# Patient Record
Sex: FEMALE | Race: WHITE | NOT HISPANIC OR LATINO | URBAN - METROPOLITAN AREA
[De-identification: names, ages, dates, MRNs, and addresses within clinical notes are randomized per-mention and may not be internally consistent; named-entity substitution may affect disease eponyms.]

---

## 2017-11-27 PROBLEM — Z00.00 ENCOUNTER FOR PREVENTIVE HEALTH EXAMINATION: Status: ACTIVE | Noted: 2017-11-27

## 2020-08-20 ENCOUNTER — INPATIENT (INPATIENT)
Facility: HOSPITAL | Age: 60
LOS: 1 days | Discharge: HOME | DRG: 281 | End: 2020-08-22
Attending: STUDENT IN AN ORGANIZED HEALTH CARE EDUCATION/TRAINING PROGRAM | Admitting: STUDENT IN AN ORGANIZED HEALTH CARE EDUCATION/TRAINING PROGRAM
Payer: MEDICARE

## 2020-08-20 VITALS
SYSTOLIC BLOOD PRESSURE: 190 MMHG | RESPIRATION RATE: 18 BRPM | HEART RATE: 119 BPM | TEMPERATURE: 98 F | OXYGEN SATURATION: 97 % | WEIGHT: 203.05 LBS | HEIGHT: 67 IN | DIASTOLIC BLOOD PRESSURE: 104 MMHG

## 2020-08-20 DIAGNOSIS — R79.89 OTHER SPECIFIED ABNORMAL FINDINGS OF BLOOD CHEMISTRY: ICD-10-CM

## 2020-08-20 LAB
ALBUMIN SERPL ELPH-MCNC: 4.6 G/DL — SIGNIFICANT CHANGE UP (ref 3.3–5)
ALP SERPL-CCNC: 82 U/L — SIGNIFICANT CHANGE UP (ref 40–120)
ALT FLD-CCNC: 13 U/L — SIGNIFICANT CHANGE UP (ref 10–45)
ANION GAP SERPL CALC-SCNC: 14 MMOL/L — SIGNIFICANT CHANGE UP (ref 5–17)
APTT BLD: 31.5 SEC — SIGNIFICANT CHANGE UP (ref 27.5–35.5)
AST SERPL-CCNC: 13 U/L — SIGNIFICANT CHANGE UP (ref 10–40)
BASE EXCESS BLDV CALC-SCNC: 0.3 MMOL/L — SIGNIFICANT CHANGE UP (ref -2–2)
BASOPHILS # BLD AUTO: 0.06 K/UL — SIGNIFICANT CHANGE UP (ref 0–0.2)
BASOPHILS NFR BLD AUTO: 0.6 % — SIGNIFICANT CHANGE UP (ref 0–2)
BILIRUB SERPL-MCNC: 0.2 MG/DL — SIGNIFICANT CHANGE UP (ref 0.2–1.2)
BUN SERPL-MCNC: 6 MG/DL — LOW (ref 7–23)
CA-I SERPL-SCNC: 1.1 MMOL/L — LOW (ref 1.12–1.3)
CALCIUM SERPL-MCNC: 9.4 MG/DL — SIGNIFICANT CHANGE UP (ref 8.4–10.5)
CHLORIDE BLDV-SCNC: 97 MMOL/L — SIGNIFICANT CHANGE UP (ref 96–108)
CHLORIDE SERPL-SCNC: 93 MMOL/L — LOW (ref 96–108)
CO2 BLDV-SCNC: 26 MMOL/L — SIGNIFICANT CHANGE UP (ref 22–30)
CO2 SERPL-SCNC: 23 MMOL/L — SIGNIFICANT CHANGE UP (ref 22–31)
CREAT SERPL-MCNC: 0.61 MG/DL — SIGNIFICANT CHANGE UP (ref 0.5–1.3)
D DIMER BLD IA.RAPID-MCNC: 412 NG/ML DDU — HIGH
EOSINOPHIL # BLD AUTO: 0.2 K/UL — SIGNIFICANT CHANGE UP (ref 0–0.5)
EOSINOPHIL NFR BLD AUTO: 1.9 % — SIGNIFICANT CHANGE UP (ref 0–6)
GAS PNL BLDV: 128 MMOL/L — LOW (ref 135–145)
GAS PNL BLDV: SIGNIFICANT CHANGE UP
GAS PNL BLDV: SIGNIFICANT CHANGE UP
GLUCOSE BLDV-MCNC: 156 MG/DL — HIGH (ref 70–99)
GLUCOSE SERPL-MCNC: 160 MG/DL — HIGH (ref 70–99)
HCO3 BLDV-SCNC: 25 MMOL/L — SIGNIFICANT CHANGE UP (ref 21–29)
HCT VFR BLD CALC: 40.2 % — SIGNIFICANT CHANGE UP (ref 34.5–45)
HCT VFR BLDA CALC: 41 % — SIGNIFICANT CHANGE UP (ref 39–50)
HGB BLD CALC-MCNC: 13.3 G/DL — SIGNIFICANT CHANGE UP (ref 11.5–15.5)
HGB BLD-MCNC: 13.2 G/DL — SIGNIFICANT CHANGE UP (ref 11.5–15.5)
IMM GRANULOCYTES NFR BLD AUTO: 0.4 % — SIGNIFICANT CHANGE UP (ref 0–1.5)
INR BLD: 0.96 RATIO — SIGNIFICANT CHANGE UP (ref 0.88–1.16)
LACTATE BLDV-MCNC: 2.2 MMOL/L — HIGH (ref 0.7–2)
LYMPHOCYTES # BLD AUTO: 1.44 K/UL — SIGNIFICANT CHANGE UP (ref 1–3.3)
LYMPHOCYTES # BLD AUTO: 13.8 % — SIGNIFICANT CHANGE UP (ref 13–44)
MAGNESIUM SERPL-MCNC: 1.7 MG/DL — SIGNIFICANT CHANGE UP (ref 1.6–2.6)
MCHC RBC-ENTMCNC: 29 PG — SIGNIFICANT CHANGE UP (ref 27–34)
MCHC RBC-ENTMCNC: 32.8 GM/DL — SIGNIFICANT CHANGE UP (ref 32–36)
MCV RBC AUTO: 88.4 FL — SIGNIFICANT CHANGE UP (ref 80–100)
MONOCYTES # BLD AUTO: 0.48 K/UL — SIGNIFICANT CHANGE UP (ref 0–0.9)
MONOCYTES NFR BLD AUTO: 4.6 % — SIGNIFICANT CHANGE UP (ref 2–14)
NEUTROPHILS # BLD AUTO: 8.2 K/UL — HIGH (ref 1.8–7.4)
NEUTROPHILS NFR BLD AUTO: 78.7 % — HIGH (ref 43–77)
NRBC # BLD: 0 /100 WBCS — SIGNIFICANT CHANGE UP (ref 0–0)
NT-PROBNP SERPL-SCNC: 358 PG/ML — HIGH (ref 0–300)
PCO2 BLDV: 43 MMHG — SIGNIFICANT CHANGE UP (ref 35–50)
PH BLDV: 7.38 — SIGNIFICANT CHANGE UP (ref 7.35–7.45)
PLATELET # BLD AUTO: 334 K/UL — SIGNIFICANT CHANGE UP (ref 150–400)
PO2 BLDV: 40 MMHG — SIGNIFICANT CHANGE UP (ref 25–45)
POTASSIUM BLDV-SCNC: 4 MMOL/L — SIGNIFICANT CHANGE UP (ref 3.5–5.3)
POTASSIUM SERPL-MCNC: 4.3 MMOL/L — SIGNIFICANT CHANGE UP (ref 3.5–5.3)
POTASSIUM SERPL-SCNC: 4.3 MMOL/L — SIGNIFICANT CHANGE UP (ref 3.5–5.3)
PROT SERPL-MCNC: 7.2 G/DL — SIGNIFICANT CHANGE UP (ref 6–8.3)
PROTHROM AB SERPL-ACNC: 11.4 SEC — SIGNIFICANT CHANGE UP (ref 10.6–13.6)
RBC # BLD: 4.55 M/UL — SIGNIFICANT CHANGE UP (ref 3.8–5.2)
RBC # FLD: 13.2 % — SIGNIFICANT CHANGE UP (ref 10.3–14.5)
SAO2 % BLDV: 76 % — SIGNIFICANT CHANGE UP (ref 67–88)
SODIUM SERPL-SCNC: 130 MMOL/L — LOW (ref 135–145)
TROPONIN T, HIGH SENSITIVITY RESULT: 214 NG/L — HIGH (ref 0–51)
TROPONIN T, HIGH SENSITIVITY RESULT: 345 NG/L — HIGH (ref 0–51)
TSH SERPL-MCNC: 1.3 UIU/ML — SIGNIFICANT CHANGE UP (ref 0.27–4.2)
WBC # BLD: 10.42 K/UL — SIGNIFICANT CHANGE UP (ref 3.8–10.5)
WBC # FLD AUTO: 10.42 K/UL — SIGNIFICANT CHANGE UP (ref 3.8–10.5)

## 2020-08-20 PROCEDURE — 99291 CRITICAL CARE FIRST HOUR: CPT

## 2020-08-20 PROCEDURE — 71275 CT ANGIOGRAPHY CHEST: CPT | Mod: 26

## 2020-08-20 PROCEDURE — 93010 ELECTROCARDIOGRAM REPORT: CPT

## 2020-08-20 PROCEDURE — 71046 X-RAY EXAM CHEST 2 VIEWS: CPT | Mod: 26

## 2020-08-20 RX ORDER — HEPARIN SODIUM 5000 [USP'U]/ML
6000 INJECTION INTRAVENOUS; SUBCUTANEOUS EVERY 6 HOURS
Refills: 0 | Status: DISCONTINUED | OUTPATIENT
Start: 2020-08-20 | End: 2020-08-22

## 2020-08-20 RX ORDER — CLOPIDOGREL BISULFATE 75 MG/1
300 TABLET, FILM COATED ORAL ONCE
Refills: 0 | Status: COMPLETED | OUTPATIENT
Start: 2020-08-20 | End: 2020-08-20

## 2020-08-20 RX ORDER — HEPARIN SODIUM 5000 [USP'U]/ML
5000 INJECTION INTRAVENOUS; SUBCUTANEOUS ONCE
Refills: 0 | Status: COMPLETED | OUTPATIENT
Start: 2020-08-20 | End: 2020-08-20

## 2020-08-20 RX ORDER — HEPARIN SODIUM 5000 [USP'U]/ML
INJECTION INTRAVENOUS; SUBCUTANEOUS
Qty: 25000 | Refills: 0 | Status: DISCONTINUED | OUTPATIENT
Start: 2020-08-20 | End: 2020-08-22

## 2020-08-20 RX ORDER — FENTANYL CITRATE 50 UG/ML
25 INJECTION INTRAVENOUS ONCE
Refills: 0 | Status: DISCONTINUED | OUTPATIENT
Start: 2020-08-20 | End: 2020-08-20

## 2020-08-20 RX ORDER — ASPIRIN/CALCIUM CARB/MAGNESIUM 324 MG
324 TABLET ORAL DAILY
Refills: 0 | Status: DISCONTINUED | OUTPATIENT
Start: 2020-08-20 | End: 2020-08-22

## 2020-08-20 RX ORDER — FAMOTIDINE 10 MG/ML
20 INJECTION INTRAVENOUS ONCE
Refills: 0 | Status: COMPLETED | OUTPATIENT
Start: 2020-08-20 | End: 2020-08-20

## 2020-08-20 RX ORDER — METOPROLOL TARTRATE 50 MG
25 TABLET ORAL ONCE
Refills: 0 | Status: COMPLETED | OUTPATIENT
Start: 2020-08-20 | End: 2020-08-20

## 2020-08-20 RX ORDER — ASPIRIN/CALCIUM CARB/MAGNESIUM 324 MG
325 TABLET ORAL ONCE
Refills: 0 | Status: DISCONTINUED | OUTPATIENT
Start: 2020-08-20 | End: 2020-08-20

## 2020-08-20 RX ADMIN — CLOPIDOGREL BISULFATE 300 MILLIGRAM(S): 75 TABLET, FILM COATED ORAL at 20:45

## 2020-08-20 RX ADMIN — Medication 324 MILLIGRAM(S): at 18:15

## 2020-08-20 RX ADMIN — HEPARIN SODIUM 1000 UNIT(S)/HR: 5000 INJECTION INTRAVENOUS; SUBCUTANEOUS at 20:51

## 2020-08-20 RX ADMIN — HEPARIN SODIUM 5000 UNIT(S): 5000 INJECTION INTRAVENOUS; SUBCUTANEOUS at 20:46

## 2020-08-20 RX ADMIN — FENTANYL CITRATE 25 MICROGRAM(S): 50 INJECTION INTRAVENOUS at 22:32

## 2020-08-20 RX ADMIN — Medication 25 MILLIGRAM(S): at 20:45

## 2020-08-20 RX ADMIN — FENTANYL CITRATE 25 MICROGRAM(S): 50 INJECTION INTRAVENOUS at 23:52

## 2020-08-20 RX ADMIN — FAMOTIDINE 20 MILLIGRAM(S): 10 INJECTION INTRAVENOUS at 22:32

## 2020-08-20 NOTE — ED ADULT NURSE NOTE - OBJECTIVE STATEMENT
60 y.o F A&Ox3 with PMH of seizure disorder, anxiety, and diabetes presents to the ED c.o heart palpitations. Pt. reports she was walking outside around 1220 and suddenly developed palpitations associated with difficulty breathing. Describes as "heart racing" sensation. Pt. reports she then had some difficulty going up stairs which is unusual for her. Pt. took blood pressure at home - pressure was a lot higher than usual. States symptoms have since subsided however sensation is still lingering. Denies SOB at this time. Denies CP, dizziness, HA, n/v/d, ABD pain, cough, lower extremity swelling, fever, chills, and numbness/tingling sensation. States she has had panic attacks in the past, but states "this feels different." Pt. does not appear to be in any acute distress. Gross neuro intact. EKG done. Pt. placed on CM. Safety and comfort provided.

## 2020-08-20 NOTE — ED ADULT NURSE REASSESSMENT NOTE - NS ED NURSE REASSESS COMMENT FT1
2200, pt reporting midsternal chest pain that gets worse when she takes deep inspiration.  pt also hypoxic to 90% on 2 liters nasal canula.   repeat ecg obtained, md alberta carvajal aware of pts chest pain.    2230, pt improved to 95% on 4 liters nasal canula, md alberta carvajal aware of increased oxygen demand, no other orders at this time.

## 2020-08-20 NOTE — ED ADULT NURSE REASSESSMENT NOTE - NS ED NURSE REASSESS COMMENT FT1
pt continues to endorese mid sternal chest pain, pt reports its a 3-4/10, md alberta carvajal aware, fentanyl was given, rn will continue to monitor.

## 2020-08-20 NOTE — ED PROVIDER NOTE - CARDIAC, MLM
Tachycardia, regular rhythm.  Heart sounds S1, S2.  No murmurs, rubs or gallops.  +2 pulses radial/DP/PT.  Mild leg swelling B/L, non-pitting.

## 2020-08-20 NOTE — ED ADULT NURSE REASSESSMENT NOTE - NS ED NURSE REASSESS COMMENT FT1
Cardiology at bedside. Pt. denies CP, SOB, dizziness, n/v/d, and diaphoresis. Pt. reports symptoms have subsided. Repeat EKG performed. Actual weight obtained. Pt. informed on blood results and plan of care at this time.

## 2020-08-20 NOTE — ED ADULT NURSE REASSESSMENT NOTE - NS ED NURSE REASSESS COMMENT FT1
pt resting on stretcher, pt is alert and oriented x3, speaking full clear sentences, respirations non-labored, skin warm dry and intact, strong pulses throughout, abd is soft and non-distended,  pt moving all extremities spontaneously, pt denies any chest pain or shortness of breath at this time, vital sig ns stable, pt denies any needs, rn will continue to monitor.

## 2020-08-20 NOTE — ED ADULT TRIAGE NOTE - CHIEF COMPLAINT QUOTE
pt states "I feel like my heart is racing", associated with feeling like she is unable to take a full breath, denies sob, diff breathing or cp

## 2020-08-20 NOTE — ED PROVIDER NOTE - OBJECTIVE STATEMENT
Patient is a 59yo female w/ PMHx metabolic syndrome, sz disorder controlled by medication (lamictal, trileptal), panic attacks, recent toe fracture w/ some immobility x1-2wks advised by PCP to come to ED for acute onset episode of tachycardia, HTN.  Patient states that she was walking outside at 12:00 and felt her "heart was racing".  She then went home, had some difficulty walking up the stairs, she called PCP, and came to [facility] ED.  She has experienced episodes like this in the past but usually due to a stressful trigger, felt that "this episode was different", but denies chest pain, palpitations, SOB, dizziness, vertigo, sweating, N/V, abd pain, changes in vision/hearing, paresthesias, muscle weakness, leg pain/swelling.  She states her seizures have been medicated for years and experiences 1-2x per year, but experienced 4x already since January 2020, with the most recent one 3 weeks ago.  Denies any LoC, falls, post-ictal state, incontinence, tongue biting today.  Denies recent fevers, URI sx, weight loss, fevers, sensitivity to hot/cold.  Patient also been taking Chinese supplement (Francine Adonis Won) x3mos. No h/o COVID-19, no known exposure to persons with COVID-19, maintains social distancing and proper mask use at all times. Patient is a 59yo female w/ PMHx metabolic syndrome, sz disorder controlled by medication (lamictal, trileptal), panic attacks, recent toe fracture w/ some immobility x1-2wks advised by PCP to come to ED for acute onset episode of tachycardia, HTN.  Patient states that she was walking outside at 12:00 and felt her "heart was racing".  She then went home, had some difficulty walking up the stairs, she called PCP, and came to [facility] ED.  She has experienced episodes like this in the past but usually due to a stressful trigger, felt that "this episode was different", but denies chest pain, palpitations, SOB, dizziness, vertigo, sweating, N/V, abd pain, changes in vision/hearing, paresthesias, muscle weakness, leg pain/swelling.  She states her seizures have been medicated for years and experiences 1-2x per year, but experienced 4x already since January 2020, with the most recent one 3 weeks ago.  Denies any LoC, falls, post-ictal state, incontinence, tongue biting today.  Denies recent fevers, URI sx, weight loss, fevers, sensitivity to hot/cold.  Patient also been taking Chinese supplement (Francine Adonis Won) x3mos. No h/o COVID-19, no known exposure to persons with COVID-19, maintains social distancing and proper mask use at all times.    PCP: Mayco Arias MD ([facility]) ([phone])

## 2020-08-20 NOTE — ED PROVIDER NOTE - PROGRESS NOTE DETAILS
Peter Tyson, MS4:  Trops 200+, +, initial EKG unchanged in comparison to old EKG, intermittent a-flutter on monitor concerning for NSTEMI.  Loaded with 325mg chewable aspirin, consulted cards (Dionne), will hold heparin/plavix until she examines patient.  Patient and her  made aware of plan, patient upset by dx but agrees with team going forward. repeat EKG shows tachy, TWI in aVL Peter yTson, MS4:  Repeat hsTn increased to 345.  D-Dimer elevated to 400+, upper limit of normal 580 based on age, nonspecific value at this point.  CXR clear per rads initial read.  Notified cards of plan to initiate heparin/plavix and admit. Peter Tyson, MS4:  Repeat hsTn increased to 345.  D-Dimer elevated to 400+, upper limit of normal 580 based on age, nonspecific value at this point.  CXR clear per rads initial read.  Notified cards of plan to initiate heparin/plavix and admit.  Cards will follow patient.

## 2020-08-20 NOTE — ED PROVIDER NOTE - ATTENDING CONTRIBUTION TO CARE
60yr F hx of HL, HTN, metabolic syndrome, sz disorder, p/w chest pain equivalent (palpitations, weakness) highly suspicious for cardiac etiology. EKG unchanged from 4yrs prior. initial troponin 214, ordered d dimer given recent hx of toe fracture and decreased mobility however, concern for PE low.   cardiology consulted at 6:00PM and will see pt momentarily.

## 2020-08-20 NOTE — ED PROVIDER NOTE - CLINICAL SUMMARY MEDICAL DECISION MAKING FREE TEXT BOX
In summary, this is a 59yo female w/ PMHx metabolic syndrome, sz disorder controlled by medication (lamictal, trileptal), panic attacks, recent toe fracture advised by PCP to come to ED for acute onset episode of tachycardia, HTN most likely consistent with prior panic attacks w/ need to r/o acute MI, arrhythmia, PE, hyperthyroidism.  Less concern for pheochromocytoma and other rarer etiologies at this time.  V/S significant for  /99, RR 23, afebrile.  EKG sinus, narrow-complex tachycardia but otherwise unchanged from prior read several years ago.  Wells PE Score 3, PERC Score 3.  Will investigate Francine Adonis Barroso supplement (contains licorice) and correlate with returned CMP results if hypokalemia or other possible findings.  Plan includes monitoring V/S, labs, CXR.  Will consider further consultation/management as appropriate.

## 2020-08-20 NOTE — ED ADULT NURSE REASSESSMENT NOTE - NS ED NURSE REASSESS COMMENT FT1
md alberta carvajal aware of elevated trop, md paging cardiology at this time. no change in pt condition.

## 2020-08-21 DIAGNOSIS — E78.5 HYPERLIPIDEMIA, UNSPECIFIED: ICD-10-CM

## 2020-08-21 DIAGNOSIS — E11.9 TYPE 2 DIABETES MELLITUS WITHOUT COMPLICATIONS: ICD-10-CM

## 2020-08-21 DIAGNOSIS — R79.89 OTHER SPECIFIED ABNORMAL FINDINGS OF BLOOD CHEMISTRY: ICD-10-CM

## 2020-08-21 DIAGNOSIS — E87.1 HYPO-OSMOLALITY AND HYPONATREMIA: ICD-10-CM

## 2020-08-21 DIAGNOSIS — G40.909 EPILEPSY, UNSPECIFIED, NOT INTRACTABLE, WITHOUT STATUS EPILEPTICUS: ICD-10-CM

## 2020-08-21 LAB
A1C WITH ESTIMATED AVERAGE GLUCOSE RESULT: 6.3 % — HIGH (ref 4–5.6)
ANION GAP SERPL CALC-SCNC: 12 MMOL/L — SIGNIFICANT CHANGE UP (ref 5–17)
APTT BLD: 38.6 SEC — HIGH (ref 27.5–35.5)
APTT BLD: 74 SEC — HIGH (ref 27.5–35.5)
BUN SERPL-MCNC: 6 MG/DL — LOW (ref 7–23)
CALCIUM SERPL-MCNC: 9 MG/DL — SIGNIFICANT CHANGE UP (ref 8.4–10.5)
CHLORIDE SERPL-SCNC: 91 MMOL/L — LOW (ref 96–108)
CHOLEST SERPL-MCNC: 282 MG/DL — HIGH (ref 10–199)
CK MB BLD-MCNC: 8.3 % — HIGH (ref 0–3.5)
CK MB CFR SERPL CALC: 8.6 NG/ML — HIGH (ref 0–3.8)
CK SERPL-CCNC: 103 U/L — SIGNIFICANT CHANGE UP (ref 25–170)
CO2 SERPL-SCNC: 23 MMOL/L — SIGNIFICANT CHANGE UP (ref 22–31)
CREAT SERPL-MCNC: 0.51 MG/DL — SIGNIFICANT CHANGE UP (ref 0.5–1.3)
ESTIMATED AVERAGE GLUCOSE: 134 MG/DL — HIGH (ref 68–114)
GLUCOSE SERPL-MCNC: 226 MG/DL — HIGH (ref 70–99)
HCT VFR BLD CALC: 42.2 % — SIGNIFICANT CHANGE UP (ref 34.5–45)
HDLC SERPL-MCNC: 89 MG/DL — SIGNIFICANT CHANGE UP
HGB BLD-MCNC: 14.4 G/DL — SIGNIFICANT CHANGE UP (ref 11.5–15.5)
LIPID PNL WITH DIRECT LDL SERPL: 169 MG/DL — HIGH
MCHC RBC-ENTMCNC: 29.5 PG — SIGNIFICANT CHANGE UP (ref 27–34)
MCHC RBC-ENTMCNC: 34.1 GM/DL — SIGNIFICANT CHANGE UP (ref 32–36)
MCV RBC AUTO: 86.5 FL — SIGNIFICANT CHANGE UP (ref 80–100)
NRBC # BLD: 0 /100 WBCS — SIGNIFICANT CHANGE UP (ref 0–0)
PLATELET # BLD AUTO: 358 K/UL — SIGNIFICANT CHANGE UP (ref 150–400)
POTASSIUM SERPL-MCNC: 4.6 MMOL/L — SIGNIFICANT CHANGE UP (ref 3.5–5.3)
POTASSIUM SERPL-SCNC: 4.6 MMOL/L — SIGNIFICANT CHANGE UP (ref 3.5–5.3)
RBC # BLD: 4.88 M/UL — SIGNIFICANT CHANGE UP (ref 3.8–5.2)
RBC # FLD: 13.2 % — SIGNIFICANT CHANGE UP (ref 10.3–14.5)
SARS-COV-2 IGG SERPL QL IA: NEGATIVE — SIGNIFICANT CHANGE UP
SARS-COV-2 IGM SERPL IA-ACNC: <0.1 INDEX — SIGNIFICANT CHANGE UP
SARS-COV-2 RNA SPEC QL NAA+PROBE: SIGNIFICANT CHANGE UP
SODIUM SERPL-SCNC: 126 MMOL/L — LOW (ref 135–145)
TOTAL CHOLESTEROL/HDL RATIO MEASUREMENT: 3.2 RATIO — LOW (ref 3.3–7.1)
TRIGL SERPL-MCNC: 122 MG/DL — SIGNIFICANT CHANGE UP (ref 10–149)
TROPONIN T, HIGH SENSITIVITY RESULT: 190 NG/L — HIGH (ref 0–51)
TROPONIN T, HIGH SENSITIVITY RESULT: 326 NG/L — HIGH (ref 0–51)
WBC # BLD: 11.51 K/UL — HIGH (ref 3.8–10.5)
WBC # FLD AUTO: 11.51 K/UL — HIGH (ref 3.8–10.5)

## 2020-08-21 PROCEDURE — 93458 L HRT ARTERY/VENTRICLE ANGIO: CPT | Mod: 26,GC

## 2020-08-21 PROCEDURE — 99152 MOD SED SAME PHYS/QHP 5/>YRS: CPT | Mod: GC

## 2020-08-21 PROCEDURE — 99255 IP/OBS CONSLTJ NEW/EST HI 80: CPT

## 2020-08-21 PROCEDURE — 93306 TTE W/DOPPLER COMPLETE: CPT | Mod: 26

## 2020-08-21 PROCEDURE — 93010 ELECTROCARDIOGRAM REPORT: CPT | Mod: 59

## 2020-08-21 RX ORDER — DEXTROSE 50 % IN WATER 50 %
25 SYRINGE (ML) INTRAVENOUS ONCE
Refills: 0 | Status: DISCONTINUED | OUTPATIENT
Start: 2020-08-21 | End: 2020-08-22

## 2020-08-21 RX ORDER — GLUCAGON INJECTION, SOLUTION 0.5 MG/.1ML
1 INJECTION, SOLUTION SUBCUTANEOUS ONCE
Refills: 0 | Status: DISCONTINUED | OUTPATIENT
Start: 2020-08-21 | End: 2020-08-22

## 2020-08-21 RX ORDER — SODIUM CHLORIDE 9 MG/ML
1000 INJECTION, SOLUTION INTRAVENOUS
Refills: 0 | Status: DISCONTINUED | OUTPATIENT
Start: 2020-08-21 | End: 2020-08-22

## 2020-08-21 RX ORDER — DEXTROSE 50 % IN WATER 50 %
15 SYRINGE (ML) INTRAVENOUS ONCE
Refills: 0 | Status: DISCONTINUED | OUTPATIENT
Start: 2020-08-21 | End: 2020-08-22

## 2020-08-21 RX ORDER — OXCARBAZEPINE 300 MG/1
1 TABLET, FILM COATED ORAL
Qty: 0 | Refills: 0 | DISCHARGE

## 2020-08-21 RX ORDER — LAMOTRIGINE 25 MG/1
300 TABLET, ORALLY DISINTEGRATING ORAL
Refills: 0 | Status: DISCONTINUED | OUTPATIENT
Start: 2020-08-21 | End: 2020-08-22

## 2020-08-21 RX ORDER — FUROSEMIDE 40 MG
40 TABLET ORAL
Refills: 0 | Status: DISCONTINUED | OUTPATIENT
Start: 2020-08-21 | End: 2020-08-22

## 2020-08-21 RX ORDER — OXCARBAZEPINE 300 MG/1
600 TABLET, FILM COATED ORAL
Refills: 0 | Status: DISCONTINUED | OUTPATIENT
Start: 2020-08-21 | End: 2020-08-22

## 2020-08-21 RX ORDER — DEXTROSE 50 % IN WATER 50 %
12.5 SYRINGE (ML) INTRAVENOUS ONCE
Refills: 0 | Status: DISCONTINUED | OUTPATIENT
Start: 2020-08-21 | End: 2020-08-22

## 2020-08-21 RX ORDER — METFORMIN HYDROCHLORIDE 850 MG/1
500 TABLET ORAL
Refills: 0 | Status: DISCONTINUED | OUTPATIENT
Start: 2020-08-21 | End: 2020-08-21

## 2020-08-21 RX ORDER — INSULIN LISPRO 100/ML
VIAL (ML) SUBCUTANEOUS
Refills: 0 | Status: DISCONTINUED | OUTPATIENT
Start: 2020-08-21 | End: 2020-08-22

## 2020-08-21 RX ORDER — ACETAMINOPHEN 500 MG
975 TABLET ORAL EVERY 6 HOURS
Refills: 0 | Status: DISCONTINUED | OUTPATIENT
Start: 2020-08-21 | End: 2020-08-22

## 2020-08-21 RX ORDER — ONDANSETRON 8 MG/1
4 TABLET, FILM COATED ORAL EVERY 6 HOURS
Refills: 0 | Status: DISCONTINUED | OUTPATIENT
Start: 2020-08-21 | End: 2020-08-22

## 2020-08-21 RX ADMIN — OXCARBAZEPINE 600 MILLIGRAM(S): 300 TABLET, FILM COATED ORAL at 11:14

## 2020-08-21 RX ADMIN — Medication 975 MILLIGRAM(S): at 07:32

## 2020-08-21 RX ADMIN — ONDANSETRON 4 MILLIGRAM(S): 8 TABLET, FILM COATED ORAL at 00:38

## 2020-08-21 RX ADMIN — HEPARIN SODIUM 1300 UNIT(S)/HR: 5000 INJECTION INTRAVENOUS; SUBCUTANEOUS at 04:51

## 2020-08-21 RX ADMIN — OXCARBAZEPINE 600 MILLIGRAM(S): 300 TABLET, FILM COATED ORAL at 19:11

## 2020-08-21 RX ADMIN — LAMOTRIGINE 300 MILLIGRAM(S): 25 TABLET, ORALLY DISINTEGRATING ORAL at 11:14

## 2020-08-21 RX ADMIN — HEPARIN SODIUM 1200 UNIT(S)/HR: 5000 INJECTION INTRAVENOUS; SUBCUTANEOUS at 12:02

## 2020-08-21 RX ADMIN — Medication 975 MILLIGRAM(S): at 06:11

## 2020-08-21 RX ADMIN — LAMOTRIGINE 300 MILLIGRAM(S): 25 TABLET, ORALLY DISINTEGRATING ORAL at 19:11

## 2020-08-21 RX ADMIN — Medication 40 MILLIGRAM(S): at 22:42

## 2020-08-21 RX ADMIN — Medication 324 MILLIGRAM(S): at 11:59

## 2020-08-21 RX ADMIN — HEPARIN SODIUM 6000 UNIT(S): 5000 INJECTION INTRAVENOUS; SUBCUTANEOUS at 04:53

## 2020-08-21 NOTE — H&P ADULT - HISTORY OF PRESENT ILLNESS
60yr old female with a pmhx of seizure disorder, prediabetes, and panic attacks who presents for an episode of heart racing and palpitations that began while walking up the stairs to her [employer] around 12:20 today. Patient states she was walking outside and felt her heart was racing . She then slowed down and gradually returned home and noted it to ease but was still present. She has noted episodes like this in the distant past. She called her PCP and he recommended that she come to the emergency room so she decided to take an UBER here.  She denied any chest pain or shortness of breath with her quickened heart rate. She denies associated diaphoresis, chest pain, SOB, dizziness, vertigo, sweating, N/V, abd pain.   Patient also been taking Chinese supplement (Francine Adonis Won) x3mos.   In the ED, her heart rate was episodically increasing and she would have intermittent episodes of anxiety however overall her pain did decrease.   In the ED stay, the patient had a new oxygen requirement, and stated that she started to have chest discomfort in the suprasternal area and down to the right side of her chest only when she took a deep breath. Her oxygen was increased to 4 liters and she is comfortable.

## 2020-08-21 NOTE — H&P ADULT - NSHPPHYSICALEXAM_GEN_ALL_CORE
Vital Signs Last 24 Hrs  T(C): 36.4 (21 Aug 2020 00:46), Max: 36.8 (20 Aug 2020 18:39)  T(F): 97.6 (21 Aug 2020 00:46), Max: 98.2 (20 Aug 2020 18:39)  HR: 82 (21 Aug 2020 00:46) (82 - 119)  BP: 144/81 (21 Aug 2020 00:46) (144/81 - 190/104)  BP(mean): 117 (20 Aug 2020 18:39) (117 - 117)  RR: 16 (21 Aug 2020 00:46) (15 - 25)  SpO2: 95% (21 Aug 2020 00:46) (90% - 100%)    GENERAL: NAD, AAOx3  HEAD:  Atraumatic, Normocephalic  EYES: EOMI, conjunctiva and sclera clear  NECK: Supple,  No LAD  CHEST/LUNG: Clear bilaterally , No wheeze  HEART: Regular rate and rhythm; No murmurs, rubs, or gallops  ABDOMEN: Soft, Nontender, Nondistended; Bowel sounds present  EXTREMITIES:  +Pulses, No clubbing, cyanosis, or edema  SKIN: No rashes or lesions

## 2020-08-21 NOTE — H&P ADULT - ASSESSMENT
60 yr old female with a pmhx seizure history presents after an episode of heart racing and palpitations found to have elevations of troponins and EKG abnormalities.

## 2020-08-21 NOTE — CONSULT NOTE ADULT - SUBJECTIVE AND OBJECTIVE BOX
Tarsha Truong MD  Cardiology Fellow, PGY-4  [phone]  All Cardiology service information can be found 24/7 on [email or web address], password: [email or web address]    Patient seen and evaluated at bedside    Chief Complaint: high heart rate    HPI: Johana Couch is a 60F with seizure disorder, prediabetes, and panic attacks who presents for tachycardia.    The patient earlier today stated that she felt like her heart rate was going fast when she was out on a walk. It started around noon, and lasted for hours, so she decided to come in. She denied any chest pain or shortness of breath with her high heart rate.     In the ED, her heart rate did increase to around the 160s, which increased from the low 100s, and would go up and down, but not suddenly. Trop was elevated to 214 then 300s. The patient during the interview did have anxiety, and then did complain of chest tightness for approximately 1 minute, and then went away. At that time her HR was around 120. Later during the ED stay, the patient did have a new oxygen requirement, and stated that she started to have chest discomfort in the suprasternal area and down to the right side of her chest only when she took a deep breath. She stated this happened after the ED gave her medications (she received ASA, plavix, heparin bolus).     PMHx:   Prediabetes  Seizure disorder    PSHx:   No significant past surgical history    Allergies:  No Known Allergies    Home Meds:    Current Medications:   aspirin  chewable 324 milliGRAM(s) Oral daily  heparin   Injectable 6000 Unit(s) IV Push every 6 hours PRN  heparin  Infusion.  Unit(s)/Hr IV Continuous <Continuous>  ondansetron Injectable 4 milliGRAM(s) IV Push every 6 hours PRN    FAMILY HISTORY:  Social History:  Smoking History:  Alcohol Use:  Drug Use:    REVIEW OF SYSTEMS:  CONSTITUTIONAL: No weakness, fevers or chills  EYES/ENT: No visual changes;  No dysphagia  NECK: No pain or stiffness  RESPIRATORY: No cough, wheezing, hemoptysis; No shortness of breath  CARDIOVASCULAR: No chest pain. + rapid heart rate; No lower extremity edema. No chest wall tenderness to palpation.  GASTROINTESTINAL: No abdominal or epigastric pain. No nausea, vomiting, or hematemesis; No diarrhea or constipation. No melena or hematochezia.  BACK: No back pain  GENITOURINARY: No dysuria, frequency or hematuria  NEUROLOGICAL: No numbness or weakness  SKIN: No itching, burning, rashes, or lesions   All other review of systems is negative unless indicated above.    Physical Exam:  T(F): 98.2 (08-20), Max: 98.2 (08-20)  HR: 87 (08-20) (87 - 119)  BP: 169/96 (08-20) (146/97 - 190/104)  RR: 16 (08-20)  SpO2: 95% (08-20)  GENERAL: No acute distress, well-developed  HEAD:  Atraumatic, Normocephalic  ENT: EOMI, PERRLA, conjunctiva and sclera clear, Neck supple, No JVD, moist mucosa  CHEST/LUNG: Clear to auscultation bilaterally; No wheeze, equal breath sounds bilaterally   BACK: No spinal tenderness  HEART: Regular rate and rhythm, tachycardic; No murmurs, rubs, or gallops  ABDOMEN: Soft, Nontender, Nondistended; Bowel sounds present  EXTREMITIES:  No clubbing, cyanosis, or edema  PSYCH: Nl behavior, nl affect  NEUROLOGY: AAOx3, non-focal, cranial nerves intact  SKIN: Normal color, No rashes or lesions  LINES:    Cardiovascular Diagnostic Testing:    ECG: Personally reviewed: Sinus tachycardia, LAD, LVH, TWI aVL, no ST elevations or depressions    Echo: None    Stress Testing: None    Cath: None    Labs: Personally reviewed                        13.2   10.42 )-----------( 334      ( 20 Aug 2020 17:12 )             40.2     08-20    130<L>  |  93<L>  |  6<L>  ----------------------------<  160<H>  4.3   |  23  |  0.61    Ca    9.4      20 Aug 2020 17:12  Mg     1.7     08-20    TPro  7.2  /  Alb  4.6  /  TBili  0.2  /  DBili  x   /  AST  13  /  ALT  13  /  AlkPhos  82  08-20    PT/INR - ( 20 Aug 2020 20:57 )   PT: 11.4 sec;   INR: 0.96 ratio         PTT - ( 20 Aug 2020 20:57 )  PTT:31.5 sec    CARDIAC MARKERS ( 20 Aug 2020 19:35 )  345 ng/L / x     / x     / x     / x     / x      CARDIAC MARKERS ( 20 Aug 2020 17:12 )  214 ng/L / x     / x     / x     / x     / x        Serum Pro-Brain Natriuretic Peptide: 358 pg/mL (08-20 @ 17:12)    Thyroid Stimulating Hormone, Serum: 1.30 uIU/mL (08-20 @ 22:13)
[facility] Consult Note Nephrology - CONSULTATION NOTE    Johana Couch is a 60F with seizure disorder, prediabetes, and panic attacks who presents for tachycardia.    The patient earlier today stated that she felt like her heart rate was going fast when she was out on a walk. It started around noon, and lasted for hours, so she decided to come in. She denied any chest pain or shortness of breath with her high heart rate.     In the ED, her heart rate did increase to around the 160s, which increased from the low 100s, and would go up and down, but not suddenly. Trop was elevated to 214 then 300s. The patient during the interview did have anxiety, and then did complain of chest tightness for approximately 1 minute, and then went away. At that time her HR was around 120. Later during the ED stay, the patient did have a new oxygen requirement, and stated that she started to have chest discomfort in the suprasternal area and down to the right side of her chest only when she took a deep breath. She stated this happened after the ED gave her medications (she received ASA, plavix, heparin bolus).     Renal consult for Hyponatremia.  Na is 126-->corrected is around 128  Pt with hx of chronic Hyponatremia- pt does not know baseline Na  Currently on trileptal  Has been consuming water.  CT chest showing pulm edema  denies any nausea or vomiting    PAST MEDICAL & SURGICAL HISTORY:  Diabetes  Seizure disorder  No significant past surgical history    No Known Allergies    Home Medications Reviewed  Hospital Medications:   MEDICATIONS  (STANDING):  aspirin  chewable 324 milliGRAM(s) Oral daily  dextrose 5%. 1000 milliLiter(s) (50 mL/Hr) IV Continuous <Continuous>  dextrose 50% Injectable 12.5 Gram(s) IV Push once  dextrose 50% Injectable 25 Gram(s) IV Push once  dextrose 50% Injectable 25 Gram(s) IV Push once  furosemide   Injectable 40 milliGRAM(s) IV Push two times a day  heparin  Infusion.  Unit(s)/Hr (10 mL/Hr) IV Continuous <Continuous>  insulin lispro (HumaLOG) corrective regimen sliding scale   SubCutaneous three times a day before meals  lamoTRIgine 300 milliGRAM(s) Oral two times a day  OXcarbazepine 600 milliGRAM(s) Oral two times a day    SOCIAL HISTORY:  Denies ETOh,Smoking,   FAMILY HISTORY:    REVIEW OF SYSTEMS:  CONSTITUTIONAL: No weakness, fevers or chills  EYES/ENT: No visual changes;  No vertigo or throat pain   NECK: No pain or stiffness  RESPIRATORY: No cough, wheezing, hemoptysis; No shortness of breath  CARDIOVASCULAR: No chest pain or palpitations.  GASTROINTESTINAL: No abdominal or epigastric pain. No nausea, vomiting, or hematemesis; No diarrhea or constipation. No melena or hematochezia.  GENITOURINARY: No dysuria, frequency, foamy urine, urinary urgency, incontinence or hematuria  NEUROLOGICAL: No numbness or weakness  SKIN: No itching, burning, rashes, or lesions   VASCULAR: No bilateral lower extremity edema.   All other review of systems is negative unless indicated above.    VITALS:  T(F): 97.8 (08-21-20 @ 11:47), Max: 98.2 (08-20-20 @ 18:39)  HR: 80 (08-21-20 @ 11:47)  BP: 157/89 (08-21-20 @ 11:47)  RR: 18 (08-21-20 @ 11:47)  SpO2: 93% (08-21-20 @ 11:47)  Wt(kg): --    08-20 @ 07:01  -  08-21 @ 07:00  --------------------------------------------------------  IN: 128 mL / OUT: 0 mL / NET: 128 mL      Height (cm): 170.2 (08-21 @ 00:46)  Weight (kg): 97 (08-21 @ 00:46)  BMI (kg/m2): 33.5 (08-21 @ 00:46)  BSA (m2): 2.08 (08-21 @ 00:46)  PHYSICAL EXAM:  Constitutional: NAD  HEENT: anicteric sclera, oropharynx clear, MMM  Neck: No JVD  Respiratory: b/l rhonchi  Cardiovascular: S1, S2, RRR  Gastrointestinal: BS+, soft, NT/ND  Extremities: + peripheral edema  Neurological: A/O x 3, no focal deficits  Psychiatric: Normal mood, normal affect  : No CVA tenderness. No collazo.       LABS:  08-21    126<L>  |  91<L>  |  6<L>  ----------------------------<  226<H>  4.6   |  23  |  0.51    Ca    9.0      21 Aug 2020 03:24  Mg     1.7     08-20    TPro  7.2  /  Alb  4.6  /  TBili  0.2  /  DBili      /  AST  13  /  ALT  13  /  AlkPhos  82  08-20    Creatinine Trend: 0.51 <--, 0.61 <--                        14.4   11.51 )-----------( 358      ( 21 Aug 2020 03:24 )             42.2     Urine Studies:      RADIOLOGY & ADDITIONAL STUDIES:
PULMONARY CONSULT  Howard Mcneil MD  [phone]    Initial HPI on admission:  HPI:  60yr old female with a pmhx of seizure disorder, prediabetes, and panic attacks who presents for an episode of heart racing and palpitations that began while walking up the stairs to her [employer] lessons around 12:20 today. Patient states she was walking outside and felt her heart was racing . She then slowed down and gradually returned home and noted it to ease but was still present. She has noted episodes like this in the distant past. She called her PCP and he recommended that she come to the emergency room so she decided to take an UBER here.  She denied any chest pain or shortness of breath with her quickened heart rate. She denies associated diaphoresis, chest pain, SOB, dizziness, vertigo, sweating, N/V, abd pain.   Patient also been taking Chinese supplement (Francine Adonis Won) x3mos.   In the ED, her heart rate was episodically increasing and she would have intermittent episodes of anxiety however overall her pain did decrease.   In the ED stay, the patient had a new oxygen requirement, and stated that she started to have chest discomfort in the suprasternal area and down to the right side of her chest only when she took a deep breath. Her oxygen was increased to 4 liters and she is comfortable.         PAST MEDICAL & SURGICAL HISTORY:  Diabetes  Seizure disorder  No significant past surgical history    Allergies    No Known Allergies    Intolerances      FAMILY HISTORY:    Social History (marital status, living situation, occupation, tobacco use, alcohol and drug use, and sexual history): lives at home with    no smoking  no drinking	     Tobacco Screening:  · Core Measure Site	No	  · Has the patient used tobacco in the past 30 days?	No	      Review of Systems: REVIEW OF SYSTEMS:  General: no weakness, no fever/chills, no weight loss/gain  Skin/Breast: no rash, no jaundice  Ophthalmologic: no vision changes, no dry eyes   Respiratory and Thorax: no cough, no wheezing, no hemoptysis, no dyspnea  Cardiovascular: + chest pain, no shortness of breath, no orthopnea  Gastrointestinal: no n/v/d, no abdominal pain, no dysphagia   Genitourinary: no dysuria, no frequency, no nocturia, no hematuria  Musculoskeletal: no trauma, no sprain/strain, no myalgias, no arthralgias, no fracture  Neurological: no HA, no dizziness, no weakness, no numbness  Psychiatric: no depression, no SI/HI  Hematology/Lymphatics: no easy bruising  Endocrine: no heat or cold intolerance. no weight gain or loss Allergic/Immunologic: no allergy or recent reaction	      Allergies and Intolerances:        Allergies:  	No Known Allergies:     Medications:  MEDICATIONS  (STANDING):  aspirin  chewable 324 milliGRAM(s) Oral daily  dextrose 5%. 1000 milliLiter(s) (50 mL/Hr) IV Continuous <Continuous>  dextrose 50% Injectable 12.5 Gram(s) IV Push once  dextrose 50% Injectable 25 Gram(s) IV Push once  dextrose 50% Injectable 25 Gram(s) IV Push once  furosemide   Injectable 40 milliGRAM(s) IV Push two times a day  heparin  Infusion.  Unit(s)/Hr (10 mL/Hr) IV Continuous <Continuous>  insulin lispro (HumaLOG) corrective regimen sliding scale   SubCutaneous three times a day before meals  lamoTRIgine 300 milliGRAM(s) Oral two times a day  OXcarbazepine 600 milliGRAM(s) Oral two times a day    MEDICATIONS  (PRN):  acetaminophen   Tablet .. 975 milliGRAM(s) Oral every 6 hours PRN Moderate Pain (4 - 6)  dextrose 40% Gel 15 Gram(s) Oral once PRN Blood Glucose LESS THAN 70 milliGRAM(s)/deciliter  glucagon  Injectable 1 milliGRAM(s) IntraMuscular once PRN Glucose LESS THAN 70 milligrams/deciliter  heparin   Injectable 6000 Unit(s) IV Push every 6 hours PRN For aPTT less than 40  ondansetron Injectable 4 milliGRAM(s) IV Push every 6 hours PRN Nausea    Vital Signs Last 24 Hrs  T(C): 36.6 (21 Aug 2020 11:47), Max: 36.8 (20 Aug 2020 18:39)  T(F): 97.8 (21 Aug 2020 11:47), Max: 98.2 (20 Aug 2020 18:39)  HR: 80 (21 Aug 2020 11:47) (80 - 119)  BP: 157/89 (21 Aug 2020 11:47) (144/74 - 190/104)  BP(mean): 117 (20 Aug 2020 18:39) (117 - 117)  RR: 18 (21 Aug 2020 11:47) (15 - 25)  SpO2: 93% (21 Aug 2020 11:47) (90% - 100%)          08-20 @ 07:01  -  08-21 @ 07:00  --------------------------------------------------------  IN: 128 mL / OUT: 0 mL / NET: 128 mL      LABS:                        14.4   11.51 )-----------( 358      ( 21 Aug 2020 03:24 )             42.2     08-21    126<L>  |  91<L>  |  6<L>  ----------------------------<  226<H>  4.6   |  23  |  0.51    Ca    9.0      21 Aug 2020 03:24  Mg     1.7     08-20    TPro  7.2  /  Alb  4.6  /  TBili  0.2  /  DBili  x   /  AST  13  /  ALT  13  /  AlkPhos  82  08-20      PT/INR - ( 20 Aug 2020 20:57 )   PT: 11.4 sec;   INR: 0.96 ratio         PTT - ( 21 Aug 2020 11:14 )  PTT:74.0 sec      Serum Pro-Brain Natriuretic Peptide: 358 pg/mL (08-20-20 @ 17:12)    Physical Examination:    General: No acute distress.      HEENT: Pupils equal, reactive to light.  Symmetric.    PULM: Clear to auscultation bilaterally, no significant sputum production    CVS: Regular rate and rhythm, no murmurs, rubs, or gallops    ABD: Soft, nondistended, nontender, normoactive bowel sounds, no masses    EXT: No edema, nontender    SKIN: Warm and well perfused, no rashes noted.    NEURO: Alert, oriented, interactive, nonfocal    RADIOLOGY REVIEWED PERSONALLY  CXR:    CTA chest:    PROCEDURE:  CT Angiography of the Chest.  90 ml of Omnipaque 350 was injected intravenously. 10 ml were discarded.  Sagittal and coronal reformats were performed as well as 3D (MIP) reconstructions.    FINDINGS:    LUNGS AND AIRWAYS: Patent central airways.  Patchy groundglass and airspace opacity in both lungswith areas of interlobular septal thickening bilaterally is suspicious for pulmonary edema.  A more discrete 1.7 x 1 cm nodular focus in the superior segment of the right upper lobe (5:44) may also be related to pulmonary edema, however an underlyinglung nodule cannot be excluded.  PLEURA: Small pleural effusions bilaterally.  MEDIASTINUM AND FAB: No lymphadenopathy.  VESSELS: Within normal limits.  HEART: Heart size is normal. No pericardial effusion.  CHEST WALL AND LOWER NECK: Within normal limits.  VISUALIZED UPPER ABDOMEN: Cholelithiasis.  Small hiatal hernia.  BONES: Within normal limits.    IMPRESSION:  1.  No pulmonary embolism.  2.  Patchy groundglass and airspace opacity in both lungs with areas of interlobular septal thickening, suspicious for pulmonary edema.  3.  Small pleural effusions bilaterally..  4.  A more discrete 1.7 x 1 cm nodular focus in the superior segment of the right upper lobe may also be related to pulmonary edema, however an underlying lung nodule cannot be excluded.  A follow-up chest CT scan can be obtained in 1-3 months to reassess this finding.  5.  Cholelithiasis.    TTE:      Assessment:    Plan:

## 2020-08-21 NOTE — H&P ADULT - ATTENDING COMMENTS
pt seen and examined. as per above.    60yr old female with a pmhx of seizure disorder, prediabetes, and panic attacks who presents for an episode of heart racing and palpitations unlike her panic attacks.  CTA neg PE  cont tele, monitor for sinus tach  trops downtrending likely demand ischemia  check TTE  cardio consult  hyponatremia, renal consult  lasix 40mg bid for pulm edema  cannot rule out RUL lung nodule, repeat CT chest outpt, pulm consult

## 2020-08-21 NOTE — H&P ADULT - PROBLEM SELECTOR PLAN 1
Patient started with aspirin, heparin drip, cardiology on board, plavix started. npo after midnight.

## 2020-08-21 NOTE — H&P ADULT - NSHPREVIEWOFSYSTEMS_GEN_ALL_CORE
REVIEW OF SYSTEMS:  General: no weakness, no fever/chills, no weight loss/gain  Skin/Breast: no rash, no jaundice  Ophthalmologic: no vision changes, no dry eyes   Respiratory and Thorax: no cough, no wheezing, no hemoptysis, no dyspnea  Cardiovascular: + chest pain, no shortness of breath, no orthopnea  Gastrointestinal: no n/v/d, no abdominal pain, no dysphagia   Genitourinary: no dysuria, no frequency, no nocturia, no hematuria  Musculoskeletal: no trauma, no sprain/strain, no myalgias, no arthralgias, no fracture  Neurological: no HA, no dizziness, no weakness, no numbness  Psychiatric: no depression, no SI/HI  Hematology/Lymphatics: no easy bruising  Endocrine: no heat or cold intolerance. no weight gain or loss  Allergic/Immunologic: no allergy or recent reaction

## 2020-08-21 NOTE — H&P ADULT - NSHPLABSRESULTS_GEN_ALL_CORE
LABS:                        13.2   10.42 )-----------( 334      ( 20 Aug 2020 17:12 )             40.2     08-20    130<L>  |  93<L>  |  6<L>  ----------------------------<  160<H>  4.3   |  23  |  0.61    Ca    9.4      20 Aug 2020 17:12  Mg     1.7     08-20    TPro  7.2  /  Alb  4.6  /  TBili  0.2  /  DBili  x   /  AST  13  /  ALT  13  /  AlkPhos  82  08-20    PT/INR - ( 20 Aug 2020 20:57 )   PT: 11.4 sec;   INR: 0.96 ratio         PTT - ( 20 Aug 2020 20:57 )  PTT:31.5 sec  CAPILLARY BLOOD GLUCOSE        CARDIAC MARKERS ( 20 Aug 2020 23:27 )  x     / x     / 103 U/L / x     / 8.6 ng/mL          RADIOLOGY & ADDITIONAL TESTS:    Imaging Personally Reviewed:  [x] YES  [ ] NO    Consultant(s) Notes Reviewed:  [x] YES  [ ] NO    Care Discussed with Consultants/Other Providers [x] YES  [ ] NO

## 2020-08-21 NOTE — CONSULT NOTE ADULT - ASSESSMENT
Johana Couch is a 60F with seizure disorder, prediabetes, and panic attacks who presents for tachycardia.    Sinus tachycardia: Patient has sinus tachycardia on EKG and telemetry, which may possibly relate to her anxiety. She does have HR that are rapid in the 150s concerning for possible atrial flutter (based on the rate) or atrial tachycardia, however it is difficult to discern on the telemetry strip. Regardless, she would not need anticoagulation if she did have atrial flutter.   - No indication to treat sinus tachycardia, and patient at times during this stay has had normal heart rates     NSTEMI, likely Type II: Patient does have a slight troponin elevation, however patient denies any chest pain that caused her to present to the hospital, and does not have any concerning EKG findings. Patient does have a TWI in aVL, however likely from her LVH. It's possible that with her elevation in her D-dimer, new oxygen requirement and tachycardia that she could have a PE.  - can trend troponin to peak  - would recommend a CTPE to evaluate for possible PE
Johana Couch is a 60F with seizure disorder, prediabetes, and panic attacks who presents for tachycardia found to have HYponatremia

## 2020-08-21 NOTE — CONSULT NOTE ADULT - PROBLEM SELECTOR RECOMMENDATION 9
Multifactorial--> Trileptal could be causing it vs pulm edema ( Hypervolemia)  TSH is within range  check cortisol , uric acid and serum osm  check URINE OSM, na ,cr, cl , k  Free h20 restriction to 800cc  start Lasix 40mg iv bid  recheck na in 6 hours  call   with results  monitor closely

## 2020-08-22 ENCOUNTER — TRANSCRIPTION ENCOUNTER (OUTPATIENT)
Age: 60
End: 2020-08-22

## 2020-08-22 VITALS
HEART RATE: 90 BPM | TEMPERATURE: 99 F | OXYGEN SATURATION: 95 % | DIASTOLIC BLOOD PRESSURE: 84 MMHG | SYSTOLIC BLOOD PRESSURE: 148 MMHG | RESPIRATION RATE: 18 BRPM

## 2020-08-22 LAB
A1C WITH ESTIMATED AVERAGE GLUCOSE RESULT: 6.3 % — HIGH (ref 4–5.6)
ANION GAP SERPL CALC-SCNC: 18 MMOL/L — HIGH (ref 5–17)
APPEARANCE UR: CLEAR — SIGNIFICANT CHANGE UP
BACTERIA # UR AUTO: NEGATIVE — SIGNIFICANT CHANGE UP
BILIRUB UR-MCNC: NEGATIVE — SIGNIFICANT CHANGE UP
BUN SERPL-MCNC: 10 MG/DL — SIGNIFICANT CHANGE UP (ref 7–23)
CALCIUM SERPL-MCNC: 9.8 MG/DL — SIGNIFICANT CHANGE UP (ref 8.4–10.5)
CHLORIDE SERPL-SCNC: 91 MMOL/L — LOW (ref 96–108)
CHLORIDE UR-SCNC: 76 MMOL/L — SIGNIFICANT CHANGE UP
CO2 SERPL-SCNC: 22 MMOL/L — SIGNIFICANT CHANGE UP (ref 22–31)
COLOR SPEC: SIGNIFICANT CHANGE UP
CREAT ?TM UR-MCNC: 66 MG/DL — SIGNIFICANT CHANGE UP
CREAT SERPL-MCNC: 0.7 MG/DL — SIGNIFICANT CHANGE UP (ref 0.5–1.3)
DIFF PNL FLD: NEGATIVE — SIGNIFICANT CHANGE UP
EPI CELLS # UR: 1 /HPF — SIGNIFICANT CHANGE UP (ref 0–5)
ESTIMATED AVERAGE GLUCOSE: 134 MG/DL — HIGH (ref 68–114)
GLUCOSE SERPL-MCNC: 237 MG/DL — HIGH (ref 70–99)
GLUCOSE UR QL: NEGATIVE — SIGNIFICANT CHANGE UP
HCT VFR BLD CALC: 44.8 % — SIGNIFICANT CHANGE UP (ref 34.5–45)
HCV AB S/CO SERPL IA: 0.13 S/CO — SIGNIFICANT CHANGE UP (ref 0–0.99)
HCV AB SERPL-IMP: SIGNIFICANT CHANGE UP
HGB BLD-MCNC: 15.4 G/DL — SIGNIFICANT CHANGE UP (ref 11.5–15.5)
HYALINE CASTS # UR AUTO: 1 /LPF — SIGNIFICANT CHANGE UP (ref 0–7)
KETONES UR-MCNC: ABNORMAL
LEUKOCYTE ESTERASE UR-ACNC: ABNORMAL
MCHC RBC-ENTMCNC: 29.5 PG — SIGNIFICANT CHANGE UP (ref 27–34)
MCHC RBC-ENTMCNC: 34.4 GM/DL — SIGNIFICANT CHANGE UP (ref 32–36)
MCV RBC AUTO: 85.8 FL — SIGNIFICANT CHANGE UP (ref 80–100)
NITRITE UR-MCNC: NEGATIVE — SIGNIFICANT CHANGE UP
NRBC # BLD: 0 /100 WBCS — SIGNIFICANT CHANGE UP (ref 0–0)
OSMOLALITY SERPL: 278 MOSMOL/KG — SIGNIFICANT CHANGE UP (ref 275–300)
PH UR: 6 — SIGNIFICANT CHANGE UP (ref 5–8)
PLATELET # BLD AUTO: 428 K/UL — HIGH (ref 150–400)
POTASSIUM SERPL-MCNC: 4.1 MMOL/L — SIGNIFICANT CHANGE UP (ref 3.5–5.3)
POTASSIUM SERPL-SCNC: 4.1 MMOL/L — SIGNIFICANT CHANGE UP (ref 3.5–5.3)
POTASSIUM UR-SCNC: 28 MMOL/L — SIGNIFICANT CHANGE UP
PROT UR-MCNC: NEGATIVE — SIGNIFICANT CHANGE UP
RBC # BLD: 5.22 M/UL — HIGH (ref 3.8–5.2)
RBC # FLD: 13.1 % — SIGNIFICANT CHANGE UP (ref 10.3–14.5)
RBC CASTS # UR COMP ASSIST: 1 /HPF — SIGNIFICANT CHANGE UP (ref 0–4)
SODIUM SERPL-SCNC: 131 MMOL/L — LOW (ref 135–145)
SODIUM UR-SCNC: 89 MMOL/L — SIGNIFICANT CHANGE UP
SP GR SPEC: 1.01 — SIGNIFICANT CHANGE UP (ref 1.01–1.02)
UROBILINOGEN FLD QL: SIGNIFICANT CHANGE UP
WBC # BLD: 9.58 K/UL — SIGNIFICANT CHANGE UP (ref 3.8–10.5)
WBC # FLD AUTO: 9.58 K/UL — SIGNIFICANT CHANGE UP (ref 3.8–10.5)
WBC UR QL: 6 /HPF — HIGH (ref 0–5)

## 2020-08-22 PROCEDURE — 82570 ASSAY OF URINE CREATININE: CPT

## 2020-08-22 PROCEDURE — 99152 MOD SED SAME PHYS/QHP 5/>YRS: CPT

## 2020-08-22 PROCEDURE — C1894: CPT

## 2020-08-22 PROCEDURE — 93005 ELECTROCARDIOGRAM TRACING: CPT

## 2020-08-22 PROCEDURE — 84443 ASSAY THYROID STIM HORMONE: CPT

## 2020-08-22 PROCEDURE — 71046 X-RAY EXAM CHEST 2 VIEWS: CPT

## 2020-08-22 PROCEDURE — 83880 ASSAY OF NATRIURETIC PEPTIDE: CPT

## 2020-08-22 PROCEDURE — 84300 ASSAY OF URINE SODIUM: CPT

## 2020-08-22 PROCEDURE — 83605 ASSAY OF LACTIC ACID: CPT

## 2020-08-22 PROCEDURE — 99232 SBSQ HOSP IP/OBS MODERATE 35: CPT | Mod: GC

## 2020-08-22 PROCEDURE — 85014 HEMATOCRIT: CPT

## 2020-08-22 PROCEDURE — 82550 ASSAY OF CK (CPK): CPT

## 2020-08-22 PROCEDURE — 86769 SARS-COV-2 COVID-19 ANTIBODY: CPT

## 2020-08-22 PROCEDURE — 86803 HEPATITIS C AB TEST: CPT

## 2020-08-22 PROCEDURE — 84132 ASSAY OF SERUM POTASSIUM: CPT

## 2020-08-22 PROCEDURE — 82435 ASSAY OF BLOOD CHLORIDE: CPT

## 2020-08-22 PROCEDURE — 99285 EMERGENCY DEPT VISIT HI MDM: CPT | Mod: 25

## 2020-08-22 PROCEDURE — 82436 ASSAY OF URINE CHLORIDE: CPT

## 2020-08-22 PROCEDURE — 96374 THER/PROPH/DIAG INJ IV PUSH: CPT

## 2020-08-22 PROCEDURE — 84295 ASSAY OF SERUM SODIUM: CPT

## 2020-08-22 PROCEDURE — 82553 CREATINE MB FRACTION: CPT

## 2020-08-22 PROCEDURE — 81001 URINALYSIS AUTO W/SCOPE: CPT

## 2020-08-22 PROCEDURE — 82962 GLUCOSE BLOOD TEST: CPT

## 2020-08-22 PROCEDURE — 82330 ASSAY OF CALCIUM: CPT

## 2020-08-22 PROCEDURE — 83930 ASSAY OF BLOOD OSMOLALITY: CPT

## 2020-08-22 PROCEDURE — 85730 THROMBOPLASTIN TIME PARTIAL: CPT

## 2020-08-22 PROCEDURE — C1769: CPT

## 2020-08-22 PROCEDURE — 83036 HEMOGLOBIN GLYCOSYLATED A1C: CPT

## 2020-08-22 PROCEDURE — 82533 TOTAL CORTISOL: CPT

## 2020-08-22 PROCEDURE — 85610 PROTHROMBIN TIME: CPT

## 2020-08-22 PROCEDURE — 80061 LIPID PANEL: CPT

## 2020-08-22 PROCEDURE — 71275 CT ANGIOGRAPHY CHEST: CPT

## 2020-08-22 PROCEDURE — 80053 COMPREHEN METABOLIC PANEL: CPT

## 2020-08-22 PROCEDURE — 93458 L HRT ARTERY/VENTRICLE ANGIO: CPT

## 2020-08-22 PROCEDURE — 83735 ASSAY OF MAGNESIUM: CPT

## 2020-08-22 PROCEDURE — 84133 ASSAY OF URINE POTASSIUM: CPT

## 2020-08-22 PROCEDURE — C1887: CPT

## 2020-08-22 PROCEDURE — 85027 COMPLETE CBC AUTOMATED: CPT

## 2020-08-22 PROCEDURE — 80048 BASIC METABOLIC PNL TOTAL CA: CPT

## 2020-08-22 PROCEDURE — C8929: CPT

## 2020-08-22 PROCEDURE — 82803 BLOOD GASES ANY COMBINATION: CPT

## 2020-08-22 PROCEDURE — 82947 ASSAY GLUCOSE BLOOD QUANT: CPT

## 2020-08-22 PROCEDURE — 84484 ASSAY OF TROPONIN QUANT: CPT

## 2020-08-22 PROCEDURE — 85379 FIBRIN DEGRADATION QUANT: CPT

## 2020-08-22 PROCEDURE — 84550 ASSAY OF BLOOD/URIC ACID: CPT

## 2020-08-22 RX ORDER — LAMOTRIGINE 25 MG/1
2 TABLET, ORALLY DISINTEGRATING ORAL
Qty: 0 | Refills: 0 | DISCHARGE
Start: 2020-08-22

## 2020-08-22 RX ORDER — METOPROLOL TARTRATE 50 MG
0.5 TABLET ORAL
Qty: 30 | Refills: 0
Start: 2020-08-22 | End: 2020-09-20

## 2020-08-22 RX ORDER — ASPIRIN/CALCIUM CARB/MAGNESIUM 324 MG
1 TABLET ORAL
Qty: 30 | Refills: 0
Start: 2020-08-22 | End: 2020-09-20

## 2020-08-22 RX ORDER — LAMOTRIGINE 25 MG/1
2 TABLET, ORALLY DISINTEGRATING ORAL
Qty: 56 | Refills: 0
Start: 2020-08-22 | End: 2020-09-04

## 2020-08-22 RX ORDER — METOPROLOL TARTRATE 50 MG
12.5 TABLET ORAL
Refills: 0 | Status: DISCONTINUED | OUTPATIENT
Start: 2020-08-22 | End: 2020-08-22

## 2020-08-22 RX ADMIN — Medication 2: at 12:16

## 2020-08-22 RX ADMIN — LAMOTRIGINE 300 MILLIGRAM(S): 25 TABLET, ORALLY DISINTEGRATING ORAL at 12:18

## 2020-08-22 RX ADMIN — OXCARBAZEPINE 600 MILLIGRAM(S): 300 TABLET, FILM COATED ORAL at 12:18

## 2020-08-22 RX ADMIN — Medication 1: at 08:10

## 2020-08-22 RX ADMIN — Medication 40 MILLIGRAM(S): at 08:10

## 2020-08-22 RX ADMIN — Medication 324 MILLIGRAM(S): at 12:23

## 2020-08-22 NOTE — DISCHARGE NOTE PROVIDER - NSDCMRMEDTOKEN_GEN_ALL_CORE_FT
Excedrin oral tablet: 1 cap(s) orally once a day, As Needed - for headache  LaMICtal 100 mg oral tablet: 1 tab(s) orally 2 times a day  LaMICtal 100 mg oral tablet: 1 tab(s) orally 2 times a day  metFORMIN:  orally   OXcarbazepine 600 mg oral tablet: 1 tab(s) orally 2 times a day  pravastatin 10 mg oral tablet: 1 tab(s) orally once a day aspirin 81 mg oral tablet, chewable: 1 tab(s) orally once a day   lamoTRIgine 150 mg oral tablet: 2 tab(s) orally 2 times a day  metFORMIN:  orally   metoprolol tartrate 25 mg oral tablet: 0.5 tab(s) orally 2 times a day   OXcarbazepine 600 mg oral tablet: 1 tab(s) orally 2 times a day  pravastatin 10 mg oral tablet: 1 tab(s) orally once a day

## 2020-08-22 NOTE — PROGRESS NOTE ADULT - SUBJECTIVE AND OBJECTIVE BOX
Patient seen and examined at bedside.    Overnight Events: NAEO. This AM, pt denies any complaints. She is extremely anxious. Had LHC in R groin; no pain. Site is CDI and RLE neurovasc intact.    Review Of Systems: No chest pain, shortness of breath, or palpitations            Current Meds:  acetaminophen   Tablet .. 975 milliGRAM(s) Oral every 6 hours PRN  aspirin  chewable 324 milliGRAM(s) Oral daily  dextrose 40% Gel 15 Gram(s) Oral once PRN  dextrose 5%. 1000 milliLiter(s) IV Continuous <Continuous>  dextrose 50% Injectable 12.5 Gram(s) IV Push once  dextrose 50% Injectable 25 Gram(s) IV Push once  dextrose 50% Injectable 25 Gram(s) IV Push once  furosemide   Injectable 40 milliGRAM(s) IV Push two times a day  glucagon  Injectable 1 milliGRAM(s) IntraMuscular once PRN  insulin lispro (HumaLOG) corrective regimen sliding scale   SubCutaneous three times a day before meals  lamoTRIgine 300 milliGRAM(s) Oral two times a day  ondansetron Injectable 4 milliGRAM(s) IV Push every 6 hours PRN  OXcarbazepine 600 milliGRAM(s) Oral two times a day      Vitals:  T(F): 98.1 (08-22), Max: 98.1 (08-22)  HR: 88 (08-22) (77 - 92)  BP: 158/80 (08-22) (140/65 - 158/80)  RR: 17 (08-22)  SpO2: 95% (08-22)  I&O's Summary    21 Aug 2020 07:01  -  22 Aug 2020 07:00  --------------------------------------------------------  IN: 659 mL / OUT: 0 mL / NET: 659 mL        Physical Exam:  Gen: NAD. Very anxious.  HEENT: NCAT.  Neck: No JVP elev.  CV: Normal S1, S2. RRR. No MRG.  Chest: CTAB. No WRR.  Abd: +BSx4. Soft. NTND.  Ext: No LE edema. R groin site CDI.  Skin: No cyanosis.                          15.4   9.58  )-----------( 428      ( 22 Aug 2020 08:35 )             44.8     08-22    131<L>  |  91<L>  |  10  ----------------------------<  237<H>  4.1   |  22  |  0.70    Ca    9.8      22 Aug 2020 08:35  Mg     1.7     08-20    TPro  7.2  /  Alb  4.6  /  TBili  0.2  /  DBili  x   /  AST  13  /  ALT  13  /  AlkPhos  82  08-20    PT/INR - ( 20 Aug 2020 20:57 )   PT: 11.4 sec;   INR: 0.96 ratio         PTT - ( 21 Aug 2020 11:14 )  PTT:74.0 sec  CARDIAC MARKERS ( 21 Aug 2020 09:33 )  190 ng/L / x     / x     / x     / x     / x      CARDIAC MARKERS ( 20 Aug 2020 23:27 )  326 ng/L / x     / x     / 103 U/L / x     / 8.6 ng/mL  CARDIAC MARKERS ( 20 Aug 2020 19:35 )  345 ng/L / x     / x     / x     / x     / x      CARDIAC MARKERS ( 20 Aug 2020 17:12 )  214 ng/L / x     / x     / x     / x     / x          Serum Pro-Brain Natriuretic Peptide: 358 pg/mL (08-20 @ 17:12)    Interpretation of Telemetry: NSR

## 2020-08-22 NOTE — DISCHARGE NOTE NURSING/CASE MANAGEMENT/SOCIAL WORK - PATIENT PORTAL LINK FT
You can access the FollowMyHealth Patient Portal offered by [facility] by registering at the following website: [email or web address]. By joining [employer]’s FollowMyHealth portal, you will also be able to view your health information using other applications (apps) compatible with our system.

## 2020-08-22 NOTE — CHART NOTE - NSCHARTNOTEFT_GEN_A_CORE
Patient A&O x3, refusing AM blood draw and AM Lasix dose. Spoke to patient at bedside to importance of current lab values given her hyponatremia, she states she understands but that she is usually hyponatremic, has not been eating and follows up with her primary physician as an outpatient. She states she is supposed to be seen in the office this Wednesday 8/26, will have blood work drawn then. Will pass on information to primary daytime team at change of shift, call bell within reach at bedside.    Shaji Palencia, ANP-BC    [phone]

## 2020-08-22 NOTE — DISCHARGE NOTE PROVIDER - CARE PROVIDER_API CALL
Yoni Walker J  CARDIOVASCULAR DISEASE  [address] [place] [address]  Phone: [phone]  Fax: [phone]  Follow Up Time: 1-3 days

## 2020-08-22 NOTE — PROGRESS NOTE ADULT - SUBJECTIVE AND OBJECTIVE BOX
Nephrology Followup Note - [phone] - Dr North / Dr Tucker / Dr Franks / Dr Sanchez / Dr Bonilla / Dr Rodriguez / Dr Villarreal / Dr Lovett  Pt seen and examined at bedside  No acute events overnight. No complaints. Feeling well.     Allergies:  No Known Allergies    Hospital Medications:   MEDICATIONS  (STANDING):  aspirin  chewable 324 milliGRAM(s) Oral daily  dextrose 5%. 1000 milliLiter(s) (50 mL/Hr) IV Continuous <Continuous>  dextrose 50% Injectable 12.5 Gram(s) IV Push once  dextrose 50% Injectable 25 Gram(s) IV Push once  dextrose 50% Injectable 25 Gram(s) IV Push once  furosemide   Injectable 40 milliGRAM(s) IV Push two times a day  insulin lispro (HumaLOG) corrective regimen sliding scale   SubCutaneous three times a day before meals  lamoTRIgine 300 milliGRAM(s) Oral two times a day  OXcarbazepine 600 milliGRAM(s) Oral two times a day      VITALS:  T(F): 98.1 (08-22-20 @ 04:30), Max: 98.1 (08-22-20 @ 04:30)  HR: 88 (08-22-20 @ 04:30)  BP: 158/80 (08-22-20 @ 04:30)  RR: 17 (08-22-20 @ 04:30)  SpO2: 95% (08-22-20 @ 04:30)  Wt(kg): --    08-21 @ 07:01  -  08-22 @ 07:00  --------------------------------------------------------  IN: 659 mL / OUT: 0 mL / NET: 659 mL        PHYSICAL EXAM:  Constitutional: NAD  HEENT: anicteric sclera, oropharynx clear, MMM  Neck: No JVD  Respiratory: CTAB, no wheezes, rales or rhonchi  Cardiovascular: S1, S2, RRR  Gastrointestinal: BS+, soft, NT/ND  Extremities: No cyanosis or clubbing. No peripheral edema  Neurological: A/O x 3, no focal deficits  Psychiatric: Normal mood, normal affect  : No CVA tenderness. No collazo.       LABS:  08-22    131<L>  |  91<L>  |  10  ----------------------------<  237<H>  4.1   |  22  |  0.70    Ca    9.8      22 Aug 2020 08:35  Mg     1.7     08-20    TPro  7.2  /  Alb  4.6  /  TBili  0.2  /  DBili      /  AST  13  /  ALT  13  /  AlkPhos  82  08-20    Creatinine Trend: 0.70 <--, 0.51 <--, 0.61 <--                        15.4   9.58  )-----------( 428      ( 22 Aug 2020 08:35 )             44.8     Urine Studies:    Chloride, Random Urine: 76 mmol/L (08-22 @ 08:54)  Sodium, Random Urine: 89 mmol/L (08-22 @ 08:54)  Potassium, Random Urine: 28 mmol/L (08-22 @ 08:54)  Creatinine, Random Urine: 66 mg/dL (08-22 @ 08:54)    RADIOLOGY & ADDITIONAL STUDIES:

## 2020-08-22 NOTE — DISCHARGE NOTE PROVIDER - HOSPITAL COURSE
60yr old female with a pmhx of seizure disorder, prediabetes, and panic attacks who presents for an episode of heart racing and palpitations unlike her panic attacks.    CTA neg PE    TTE preserved EF concern for wall motion abnormality but angiogram non obs CAD.    chronic hyponatremia likely 2/2 lamital, na improved on lasix, outpt fu with BMP    outpt fu with cardio    started on lopressor for sinus tach

## 2020-08-22 NOTE — PROGRESS NOTE ADULT - ASSESSMENT
Johana Couch is a 60F with seizure disorder, prediabetes, and panic attacks who presents for tachycardia found to have HYponatremia

## 2020-08-22 NOTE — PROGRESS NOTE ADULT - ASSESSMENT
61 y/o F w/ PMH of sz d/o, anxiety d/o w/ panic attacks c/o palps/tachycardia.    #Palps  -Suspect sinus tach related to anxiety d/o  -Tele unremarkable thus far  -Maintain lytes WNL; e/m of hypoNa as per renal  -TFTs WNL  -TTE reviewed; largely unremarkable. Official read w/ mult RWMA, however I do not appreciate those same findings  -Pt had LHC yest w/ mild dz on my independent read; await official report    #Pt will need close outpt cards f/u and consideration of long term outpt monitoring (ie: event monitor)  #Please await finalization of this note    Deniz Hollins MD  Cardiology Fellow  [phone]  All Cardiology service information can be found 24/7 on [email or web address], password: [email or web address]

## 2020-08-22 NOTE — DISCHARGE NOTE PROVIDER - NSDCCPCAREPLAN_GEN_ALL_CORE_FT
PRINCIPAL DISCHARGE DIAGNOSIS  Diagnosis: Elevated troponin  Assessment and Plan of Treatment: Now downtrending  S/p cath - no stents needed, mild CAD      SECONDARY DISCHARGE DIAGNOSES  Diagnosis: Seizure disorder  Assessment and Plan of Treatment: PRINCIPAL DISCHARGE DIAGNOSIS  Diagnosis: Elevated troponin  Assessment and Plan of Treatment: Now downtrending  S/p cath - no stents needed, mild CAD  C/w ASA and statin  Follow up with Dr. Yoni Nath      SECONDARY DISCHARGE DIAGNOSES  Diagnosis: Seizure disorder  Assessment and Plan of Treatment: Continue your anti-seizure medications as prescribed.  Follow up with your doctor for blood work monitoring of medication levels  Notify your doctor if you experience increased/worsening seizure activity, body aches, weakness, lack of coordination, feeling irritable, drowsiness, dizziness  Pt. verbalized an understanding of all instructions.

## 2020-08-22 NOTE — PROVIDER CONTACT NOTE (OTHER) - BACKGROUND
Patient admitted for feeling that her "hearty was racing" but denied pain or palpitations. diagnosis of "other specified abnormal findings of blood chemistry.

## 2020-08-22 NOTE — DISCHARGE NOTE PROVIDER - NSFOLLOWUPCLINICS_GEN_ALL_ED_FT
[facility]  Cardiology  [address] [place] [id number]  Phone: [phone]  Fax:   Follow Up Time: 1 week

## 2020-08-22 NOTE — PROGRESS NOTE ADULT - ATTENDING COMMENTS
Patient seen and examined. Agree with assessment and plan as outlined above. Patient with cath without obstructive disease. Echo reviewed as well and agree with Dr. Hollins. To clarify echo report. Overall EF is preserved. There does appear to hypokinesis of the septum. Additionally the aortic stenosis does not appear moderate. Regional wall motion abnormality likely mediated by stress as preliminarily there is no obstructive CAD. No arrhythmia on tele. Continue ASA. Statin therapy for non-obstructive CAD. Beta blocker would be helpful for BP and segmental wall motion abnormality. Would also help with tachycardia as likely sympathetically mediated.

## 2020-08-22 NOTE — PROGRESS NOTE ADULT - PROBLEM SELECTOR PLAN 1
Multifactorial--> Trileptal could be causing it vs pulm edema ( Hypervolemia)  TSH is within range  Free h20 restriction to 800cc  Continue Lasix.   Stable for discharge from renal perspective.   Can have repeat BMP in 1 weeks at PMD office.

## 2020-08-28 LAB
CORTICOSTEROID BINDING GLOBULIN RESULT: 2.7 MG/DL — SIGNIFICANT CHANGE UP
CORTIS F/TOTAL MFR SERPL: 42 % — SIGNIFICANT CHANGE UP
CORTIS SERPL-MCNC: 31 UG/DL — HIGH
CORTISOL, FREE RESULT: 13 UG/DL — HIGH

## 2022-10-21 NOTE — PATIENT PROFILE ADULT - BRADEN SENSORY
----- Message from Neeru Goodman MD sent at 10/19/2022  4:31 PM CDT -----  Not active on the portal  Please let patient know   Stool testing for blood negative/normal  Thank you  Dr. Goodman   
LVM for patient to return my call regarding stool test results   
(4) no impairment

## 2024-02-08 NOTE — CONSULT NOTE ADULT - REASON FOR ADMISSION
Pulmonology consulted given large left-sided pleural effusion and recommended diuresis.  Patient continued on Zosyn for hospital-acquired pneumonia coverage with improvement of leukocytosis.  Patient diuresed with improvement of right-sided minimal pleural effusion but continued unchanged left-sided pleural effusion.  Pulmonary reconsulted inpatient underwent thoracentesis with bloody fluid drained.  Fluid consistent with exudative etiology.  Cardiology consulted and recommended continued management with pulmonology and is unlikely to be secondary to pericardial effusion from previous ablation on 01/18/2024.  Infectious Disease consulted for antibiotic management.  2/8  recent hospitalization for an ablation for PVC (01/18/24) that was complicated by pericardial effusion with tamponade physiology s/p pericardiocentesis 01/18/24 and acute hypoxic respiratory failure, which was attributed to a Leftpleural effusion and acute diastolic dysfunction. She was diuresed with some improvement in the effusion and also completed CAP tx while inpatient with Azithro / CTX 3-5d (ended 01/23). re-admitted 02/03)- CT-chest-  left pleural effusion with volume loss and a moderate right effusion as well as a small pericardial effusion.  Diagnostic / therapeutic thoracentesis completed 02/06; drained 800mL sanguineous fluid. Samples sent for cytology, analysis, and cx. Studies  exudative effusion. Continue empiric Iv-zosyn - started 02/03. pleural fluid cxs and cytology in process   repeat chest-CT - Left upper lobe ground-glass opacity versus coalescing consolidation, suggestive of atelectasis with probable superimposed infectious pneumonia.Interval improvement of bilateral pleural  2/9  transition to oral Levo 750mg Q48h for 6days ( end date  02/15/24) f/u pleural fluid cytology in proces and  sputum cxs . continue with 2L oxygen at home. f/u with pulmonoloyg  in 4-6wks, with repeat imaging. discharge home today  
came in for heart racing today
came in for heart racing today

## 2024-11-07 NOTE — H&P ADULT - NSHPPOADEEPVENOUSTHROMB_GEN_A_CORE
Patient has 11/26/2024 with you; however she would like to know if there is even anything that can be done for Tinnitus of both ears.     Please advise; Thanks.  
no

## 2025-05-13 ENCOUNTER — EMERGENCY (EMERGENCY)
Facility: HOSPITAL | Age: 65
LOS: 1 days | End: 2025-05-13
Attending: EMERGENCY MEDICINE
Payer: MEDICARE

## 2025-05-13 VITALS
DIASTOLIC BLOOD PRESSURE: 77 MMHG | HEART RATE: 82 BPM | TEMPERATURE: 98 F | OXYGEN SATURATION: 99 % | SYSTOLIC BLOOD PRESSURE: 159 MMHG | RESPIRATION RATE: 16 BRPM

## 2025-05-13 VITALS
SYSTOLIC BLOOD PRESSURE: 158 MMHG | DIASTOLIC BLOOD PRESSURE: 80 MMHG | HEIGHT: 67 IN | WEIGHT: 169.98 LBS | RESPIRATION RATE: 18 BRPM | HEART RATE: 83 BPM | TEMPERATURE: 98 F | OXYGEN SATURATION: 99 %

## 2025-05-13 LAB
ALBUMIN SERPL ELPH-MCNC: 4.5 G/DL — SIGNIFICANT CHANGE UP (ref 3.3–5)
ALP SERPL-CCNC: 68 U/L — SIGNIFICANT CHANGE UP (ref 40–120)
ALT FLD-CCNC: 10 U/L — SIGNIFICANT CHANGE UP (ref 10–45)
ANION GAP SERPL CALC-SCNC: 19 MMOL/L — HIGH (ref 5–17)
APTT BLD: 32.3 SEC — SIGNIFICANT CHANGE UP (ref 26.1–36.8)
AST SERPL-CCNC: 17 U/L — SIGNIFICANT CHANGE UP (ref 10–40)
BASOPHILS # BLD AUTO: 0.04 K/UL — SIGNIFICANT CHANGE UP (ref 0–0.2)
BASOPHILS NFR BLD AUTO: 0.4 % — SIGNIFICANT CHANGE UP (ref 0–2)
BILIRUB SERPL-MCNC: 0.3 MG/DL — SIGNIFICANT CHANGE UP (ref 0.2–1.2)
BUN SERPL-MCNC: 14 MG/DL — SIGNIFICANT CHANGE UP (ref 7–23)
CALCIUM SERPL-MCNC: 10.2 MG/DL — SIGNIFICANT CHANGE UP (ref 8.4–10.5)
CHLORIDE SERPL-SCNC: 101 MMOL/L — SIGNIFICANT CHANGE UP (ref 96–108)
CO2 SERPL-SCNC: 20 MMOL/L — LOW (ref 22–31)
CREAT SERPL-MCNC: 0.82 MG/DL — SIGNIFICANT CHANGE UP (ref 0.5–1.3)
EGFR: 80 ML/MIN/1.73M2 — SIGNIFICANT CHANGE UP
EGFR: 80 ML/MIN/1.73M2 — SIGNIFICANT CHANGE UP
EOSINOPHIL # BLD AUTO: 0.33 K/UL — SIGNIFICANT CHANGE UP (ref 0–0.5)
EOSINOPHIL NFR BLD AUTO: 3.7 % — SIGNIFICANT CHANGE UP (ref 0–6)
GLUCOSE SERPL-MCNC: 101 MG/DL — HIGH (ref 70–99)
HCT VFR BLD CALC: 42.8 % — SIGNIFICANT CHANGE UP (ref 34.5–45)
HGB BLD-MCNC: 14.2 G/DL — SIGNIFICANT CHANGE UP (ref 11.5–15.5)
IMM GRANULOCYTES NFR BLD AUTO: 0.3 % — SIGNIFICANT CHANGE UP (ref 0–0.9)
INR BLD: 0.96 RATIO — SIGNIFICANT CHANGE UP (ref 0.85–1.16)
LYMPHOCYTES # BLD AUTO: 0.84 K/UL — LOW (ref 1–3.3)
LYMPHOCYTES # BLD AUTO: 9.4 % — LOW (ref 13–44)
MCHC RBC-ENTMCNC: 30.3 PG — SIGNIFICANT CHANGE UP (ref 27–34)
MCHC RBC-ENTMCNC: 33.2 G/DL — SIGNIFICANT CHANGE UP (ref 32–36)
MCV RBC AUTO: 91.5 FL — SIGNIFICANT CHANGE UP (ref 80–100)
MONOCYTES # BLD AUTO: 0.51 K/UL — SIGNIFICANT CHANGE UP (ref 0–0.9)
MONOCYTES NFR BLD AUTO: 5.7 % — SIGNIFICANT CHANGE UP (ref 2–14)
NEUTROPHILS # BLD AUTO: 7.16 K/UL — SIGNIFICANT CHANGE UP (ref 1.8–7.4)
NEUTROPHILS NFR BLD AUTO: 80.5 % — HIGH (ref 43–77)
NRBC BLD AUTO-RTO: 0 /100 WBCS — SIGNIFICANT CHANGE UP (ref 0–0)
PLATELET # BLD AUTO: 258 K/UL — SIGNIFICANT CHANGE UP (ref 150–400)
POTASSIUM SERPL-MCNC: 4.7 MMOL/L — SIGNIFICANT CHANGE UP (ref 3.5–5.3)
POTASSIUM SERPL-SCNC: 4.7 MMOL/L — SIGNIFICANT CHANGE UP (ref 3.5–5.3)
PROT SERPL-MCNC: 7.3 G/DL — SIGNIFICANT CHANGE UP (ref 6–8.3)
PROTHROM AB SERPL-ACNC: 11.1 SEC — SIGNIFICANT CHANGE UP (ref 9.9–13.4)
RBC # BLD: 4.68 M/UL — SIGNIFICANT CHANGE UP (ref 3.8–5.2)
RBC # FLD: 12.8 % — SIGNIFICANT CHANGE UP (ref 10.3–14.5)
SODIUM SERPL-SCNC: 140 MMOL/L — SIGNIFICANT CHANGE UP (ref 135–145)
TROPONIN T, HIGH SENSITIVITY RESULT: <6 NG/L — SIGNIFICANT CHANGE UP (ref 0–51)
WBC # BLD: 8.91 K/UL — SIGNIFICANT CHANGE UP (ref 3.8–10.5)
WBC # FLD AUTO: 8.91 K/UL — SIGNIFICANT CHANGE UP (ref 3.8–10.5)

## 2025-05-13 PROCEDURE — 0042T: CPT

## 2025-05-13 PROCEDURE — 82435 ASSAY OF BLOOD CHLORIDE: CPT

## 2025-05-13 PROCEDURE — 93005 ELECTROCARDIOGRAM TRACING: CPT

## 2025-05-13 PROCEDURE — 80053 COMPREHEN METABOLIC PANEL: CPT

## 2025-05-13 PROCEDURE — 70450 CT HEAD/BRAIN W/O DYE: CPT

## 2025-05-13 PROCEDURE — 93010 ELECTROCARDIOGRAM REPORT: CPT

## 2025-05-13 PROCEDURE — 82330 ASSAY OF CALCIUM: CPT

## 2025-05-13 PROCEDURE — 36000 PLACE NEEDLE IN VEIN: CPT | Mod: XU

## 2025-05-13 PROCEDURE — 70498 CT ANGIOGRAPHY NECK: CPT | Mod: 26

## 2025-05-13 PROCEDURE — 84132 ASSAY OF SERUM POTASSIUM: CPT

## 2025-05-13 PROCEDURE — 99285 EMERGENCY DEPT VISIT HI MDM: CPT | Mod: 25

## 2025-05-13 PROCEDURE — 83605 ASSAY OF LACTIC ACID: CPT

## 2025-05-13 PROCEDURE — 70496 CT ANGIOGRAPHY HEAD: CPT

## 2025-05-13 PROCEDURE — 94640 AIRWAY INHALATION TREATMENT: CPT

## 2025-05-13 PROCEDURE — 84484 ASSAY OF TROPONIN QUANT: CPT

## 2025-05-13 PROCEDURE — 84295 ASSAY OF SERUM SODIUM: CPT

## 2025-05-13 PROCEDURE — 82962 GLUCOSE BLOOD TEST: CPT

## 2025-05-13 PROCEDURE — 70496 CT ANGIOGRAPHY HEAD: CPT | Mod: 26

## 2025-05-13 PROCEDURE — 85730 THROMBOPLASTIN TIME PARTIAL: CPT

## 2025-05-13 PROCEDURE — 70498 CT ANGIOGRAPHY NECK: CPT

## 2025-05-13 PROCEDURE — 99291 CRITICAL CARE FIRST HOUR: CPT

## 2025-05-13 PROCEDURE — 85018 HEMOGLOBIN: CPT

## 2025-05-13 PROCEDURE — 82947 ASSAY GLUCOSE BLOOD QUANT: CPT

## 2025-05-13 PROCEDURE — 70450 CT HEAD/BRAIN W/O DYE: CPT | Mod: 26,XU

## 2025-05-13 PROCEDURE — 82803 BLOOD GASES ANY COMBINATION: CPT

## 2025-05-13 PROCEDURE — 85025 COMPLETE CBC W/AUTO DIFF WBC: CPT

## 2025-05-13 PROCEDURE — 85014 HEMATOCRIT: CPT

## 2025-05-13 PROCEDURE — 85610 PROTHROMBIN TIME: CPT

## 2025-05-13 RX ORDER — MECLIZINE HCL 12.5 MG
25 TABLET ORAL ONCE
Refills: 0 | Status: COMPLETED | OUTPATIENT
Start: 2025-05-13 | End: 2025-05-13

## 2025-05-13 RX ORDER — DIPHENHYDRAMINE HCL 12.5MG/5ML
50 ELIXIR ORAL ONCE
Refills: 0 | Status: COMPLETED | OUTPATIENT
Start: 2025-05-13 | End: 2025-05-13

## 2025-05-13 RX ORDER — FLUTICASONE PROPIONATE 50 UG/1
1 SPRAY, METERED NASAL ONCE
Refills: 0 | Status: COMPLETED | OUTPATIENT
Start: 2025-05-13 | End: 2025-05-13

## 2025-05-13 RX ADMIN — Medication 50 MILLIGRAM(S): at 20:27

## 2025-05-13 RX ADMIN — FLUTICASONE PROPIONATE 1 SPRAY(S): 50 SPRAY, METERED NASAL at 20:26

## 2025-05-13 NOTE — CONSULT NOTE ADULT - ASSESSMENT
Assessment: 65F PMHx chronic vertigo, seizure disorder on Keppra, pre-DM, on aspirin/statin presented with sudden onset room-spinning dizziness at 1630, associated with 20 seconds of slurred speech, feeling wobbly in her legs bilaterally. Took meclizine and symptoms resolved after 1 hour. Patient not vertiginous at time of code stroke. Initial VS in ED: /80. Exam: No focal deficits, no dysmetria or dysarthria, normal steady gait. CT head unremarkable. 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    pre-mRS: 0  LKN: 5/13 @1630  NIHSS: 0    Not a tenecteplase candidate due to non-disabling symptoms.  Not a mechanical thrombectomy candidate due to no LVO.    Impression: Acute vestibular syndrome c/w known history of peripheral vertigo, resolved with meclizine. Low suspicion for central cause.    Recommendations:  [] No inpatient neurological workup indicated  [] Continue home aspirin and statin for stroke prevention  [] BP goal <140/90  [] Follow up with outpatient neurologist (patient reports she has an upcoming appointment on 5/20)    Discussed with stroke fellow Dr. Beck under supervision of attending Dr. Raven Hoffman. Case and plan not final until Attending attestation.

## 2025-05-13 NOTE — ED PROVIDER NOTE - CLINICAL SUMMARY MEDICAL DECISION MAKING FREE TEXT BOX
65-year-old female past medical history of seizures on Keppra is presenting as a code stroke for evaluation of room spinning dizziness associated with feeling off balance and slurred speech onset at 4:30 PM.  Last known normal 4 PM.   Code stroke called from the waiting room neuro met patient at bedside outside of CT, on exam patient without any appreciable focal neurologic deficits, patient ambulated ambulate without issue reports feeling better at this time.  Differential diagnosis includes was not limited to transient ischemic attack versus CVA versus vertebrobasilar insufficiency versus peripheral vertigo.  Plan for stroke workup.  Likely no TNK per neurology given NIH is 0 at this time 65-year-old female past medical history of seizures on Keppra is presenting as a code stroke for evaluation of room spinning dizziness associated with feeling off balance and slurred speech onset at 4:30 PM.  Last known normal 4 PM.   Code stroke called from the waiting room neuro met patient at bedside outside of CT, on exam patient without any appreciable focal neurologic deficits, patient ambulated ambulate without issue reports feeling better at this time.  Differential diagnosis includes was not limited to transient ischemic attack versus CVA versus vertebrobasilar insufficiency versus peripheral vertigo.  Plan for stroke workup.  Likely no TNK per neurology given NIH is 0 at this time ZR

## 2025-05-13 NOTE — ED PROVIDER NOTE - PATIENT PORTAL LINK FT
You can access the FollowMyHealth Patient Portal offered by [facility] by registering at the following website: [email or web address]. By joining [employer]’s FollowMyHealth portal, you will also be able to view your health information using other applications (apps) compatible with our system.

## 2025-05-13 NOTE — ED ADULT TRIAGE NOTE - GLASGOW COMA SCALE: BEST MOTOR RESPONSE, MLM
wears eye glasses/Normal vision: sees adequately in most situations; can see medication labels, newsprint
(M6) obeys commands

## 2025-05-13 NOTE — ED ADULT NURSE NOTE - NSFALLRISKFACTORS_ED_ALL_ED
[facility] ([facility])  Progress Note    Indication: Atrial Fibrillation [id number]-EDMr= 5  Goal INR: 2.0-3.0   Duration: long term  Order Expiration Date: 7-  Pertinent History: HFpEF, PPM (2019)      Assessment  Yes No   []  [x] Missed doses:    []  [x] Extra doses:   []  [x] Significant medication changes (RX, OTC, Herbal): Torsemide; 7/30-Pt finished augmentin Rx.   []  [x] High-risk maintenance medications: Allopurinol    []  [x] Vitamin K / dietary changes (Vitamin K goal: 2-3 cups/week):    []  [x] Bleeding / bruising:    []  [x] Falls / injury:    []  [x] Acute illness:    []  [x] Alcohol intake:    []  [x] Procedures / hospitalization / ER visits: Admitted 7/24/21. Presented to [facility] emergency room with chief complaint of shortness of breath off and on for the last 3 days prior to admission. No cough or fever. He reports vague chest discomfort and bilateral lower extremity edema. Also complaining of left shoulder pain  Diagnosis: Acute on chronic systolic congestive heart failure.  + UTI-started on Rocephin.    []  [x] Other:       Active Anticoag Episode pool: [facility] [id number] [[id number]]  [facility] [place]  Referring provider pool:      Plan (5mg tablets)  INR Result: 2.4  The INR result for today is therapeutic based on the INR goal 2.0-3.0 .  Etiology:   Recommended Dose: continue  10mgMWF;5mgROW(50mg/wk)   Current dose: 10mgMWF;5mgROW(50mg/wk)  Follow-Up: 1 week - 8/17/21 home visit   Comments: NGOZI Velasco RN    VISIT HISTORY:  Date         Dose               INR   Plan  ---------------------------------------------------  08/10/21 10mgMWF;5mgROW (50mg/wk) 2.4 CPM   07/30/21 10mgMWF;5mgROW (50mg/wk) 2.1 CPM  06/23/21 10mgMWF;5mgROW (50mg/wk) 3.4 HOLDX1(W);then CPM  05/14/21 10mgMWF;5mgROW (50mg/wk) 2.4 CPM  04/23/21 10mgMWF;5mgROW (50mg/wk) 2.8 CPM  03/26/21 10mgMWF;5mgROW (50mg/wk) 2.0 CPM  02/25/21 10mgMWF;5mgROW (50mg/wk) 2.7 CPM   02/01/21  10mgMWF;5mgROW (50mg/wk) 4.0 HOLDx2(MTu); then CPM   01/05/21 10mgMWF;5mgROW (50mg/wk) 2.0 CPM   12/01/20 10mgMWF;5mgROW (50mg/wk) 2.6 CPM  10/01/20 10mgMWF;5mgROW (50mg/wk) 2.5 CPM  08/13/20 10mgMWF;5mgROW (50mg/wk) 2.2 CPM   07/13/20 10mgMWF;5mgROW (50mg/wk) 2.3 CPM   05/27/20 10mgMWF;5mgROW (50mg/wk) 2.3 CPM  04/21/20 10mgMWF;5mgROW (50mg/wk) 2.4 CPM  03/10/20 10mgMWF;5mgROW (50mg/wk) 2.2 CPM  02/25/20 10mgMWF;5mgROW (50mg/wk) 2.8 CPM    Counseling  Stressed importance of compliance with exact recommended dosage regimen  Stressed importance of compliance with consistent vitamin K intake  Instructed to notify clinic about medication changes or health status changes  Instructed to notify clinic about scheduled procedures    Provided home health nurse with verbal dosing instructions, home health nurse endorsed plan to patient/caregiver who verbalized understanding.     [facility] phone = [phone]   None known No indicators present

## 2025-05-13 NOTE — ED ADULT NURSE NOTE - OBJECTIVE STATEMENT
64 y/o F. A&Ox4. PMH seizure disorder, vertigo. Presenting to ED with complaint of slurred speech and dizziness. Pt states that starting at 4:30 she experienced  20 second episode of slurred speech and became dizzy. At this time pt denies having any s/s. Denies SOB, CP, HA, urinary s/s, n/v/d. Pt placed on cardiac monitor. Respirations even and unlabored b/l. Skin is intact. PERRL. No loss of sensation b/l. No drift of extremities. Pt took at home Meclizine as prescribed for vertigo, after onset of s/s. Pt comfort and safety measures maintained.

## 2025-05-13 NOTE — ED PROVIDER NOTE - PROGRESS NOTE DETAILS
Poly PGY3: Pt was reassessed and is doing well. Asymptomatic, cleared by neurology for outpatient follow up. Results, including any incidental findings, were discussed. Follow up and return precautions were discussed. Patient already has follow up appointment with neurologist scheduled and does not need refill of meclizine. Patient verbalized understanding

## 2025-05-13 NOTE — ED PROVIDER NOTE - OBJECTIVE STATEMENT
65-year-old female past medical history of seizures on Keppra is presenting as a code stroke for evaluation of room spinning dizziness associated with feeling off balance and slurred speech onset at 4:30 PM.  Last known normal 4 PM.  Patient states the slurred speech lasted 20 seconds, the room spinning dizziness improved after taking meclizine 1 hour prior to arrival, patient still feels a little unsteady with ambulation denies headache vision changes weakness numbness or tingling states she was conscious throughout the episode does not believe it was a seizure.  Is not on AC or AP

## 2025-05-13 NOTE — ED PROVIDER NOTE - NSFOLLOWUPINSTRUCTIONS_ED_ALL_ED_FT
Vertigo  Vertigo is the feeling that you or the things around you are moving or spinning when they're not. It's different than feeling dizzy. It can also cause:  Loss of balance.  Trouble standing or walking.  Nausea and vomiting.  This feeling can come and go at any time. It can last from a few seconds to minutes or even hours. It may go away on its own or be treated with medicine.    What are the types of vertigo?  There are two types of vertigo:  Peripheral vertigo happens when parts of your inner ear don't work like they should. This is the more common type.  Central vertigo happens when your brain and spinal cord don't work like they should.  Your health care provider will do tests to find out what kind of vertigo you have. This will help them decide on the right treatment for you.    Follow these instructions at home:  Eating and drinking    Drink enough fluid to keep your pee (urine) pale yellow.  Do not drink alcohol.  Activity    When you get up in the morning, first sit up on the side of the bed. When you feel okay, stand slowly while holding onto something.  Move slowly. Avoid sudden body or head movements.  Avoid certain positions, as told by your provider.  Use a cane if you have trouble standing or walking.  Sit down right away if you feel unsteady.  Place items in your home so they're easy for you to reach without bending or leaning over.  Return to normal activities when you're told. Ask what things are safe for you to do.  General instructions    Take your medicines only as told by your provider.  Contact a health care provider if:  Your medicines don't help or make your vertigo worse.  You get new symptoms.  You have a fever.  You have nausea or vomiting.  Your family or friends spot any changes in how you're acting.  A part of your body goes numb.  You feel tingling and prickling in a part of your body.  You get very bad headaches.  Get help right away if:  You're always dizzy or you faint.  You have a stiff neck.  You have trouble moving or speaking.  Your hands, arms, or legs feel weak.  Your hearing or eyesight changes.  These symptoms may be an emergency. Call 911 right away.  Do not wait to see if the symptoms will go away.  Do not drive yourself to the hospital.

## 2025-05-13 NOTE — CONSULT NOTE ADULT - SUBJECTIVE AND OBJECTIVE BOX
**STROKE CODE CONSULT NOTE**    Last known well time/Time of onset of symptoms: 5/13 @1630    HPI: Ms. Couch is a 65-year-old female with PMHx vertigo, seizure disorder on Keppra, pre-DM, on aspirin/statin who presented as code stroke due to episode of dizziness. Patient had sudden onset of room-spinning dizziness at 1630, associated with slurred speech which lasted less than 30 seconds. She also reports feeling wobbly in both legs, denies fall. She took meclizine shortly after onset of symptoms and symptoms resolved after approximately 1 hour. Patient reports that her symptoms feel similar to her typical vertiginous episodes with exception of the brief episode of slurred speech which she has never had before. Initial vitals in ED: /80.    Upon neurology assessment, patient reports she still feels a bit unsteady with walking. She otherwise denies any current dizziness or other deficits. No history of stroke.    PAST MEDICAL & SURGICAL HISTORY:  Seizure disorder      Diabetes      No significant past surgical history          FAMILY HISTORY:      SOCIAL HISTORY:  Smoking Cessation: Nonsmoker    ROS:  Constitutional: No fever, weight loss or fatigue  Eyes: No eye pain, visual disturbances, or discharge  ENMT:  No difficulty hearing, tinnitus, No sinus or throat pain  Neck: No pain or stiffness  Respiratory: No cough, wheezing, chills or hemoptysis  Cardiovascular: No chest pain, palpitations, shortness of breath, dizziness or leg swelling  Gastrointestinal: No abdominal pain. No nausea, vomiting or hematemesis; No diarrhea or constipation. Nohematochezia.  Genitourinary: No dysuria, frequency, hematuria or incontinence  Neurological: As per HPI  Skin: No itching, burning, rashes or lesions   Endocrine: No heat or cold intolerance; No hair loss  Musculoskeletal: No joint pain or swelling; No muscle, back or extremity pain  Psychiatric: No depression, anxiety, mood swings or difficulty sleeping  Heme/Lymph: No easy bruising or bleeding gums    MEDICATIONS  (STANDING):    MEDICATIONS  (PRN):      Allergies    No Known Allergies    Intolerances        Vital Signs Last 24 Hrs  T(C): 36.4 (13 May 2025 20:20), Max: 36.4 (13 May 2025 18:34)  T(F): 97.5 (13 May 2025 20:20), Max: 97.5 (13 May 2025 18:34)  HR: 78 (13 May 2025 20:20) (78 - 83)  BP: 165/88 (13 May 2025 20:20) (158/80 - 165/88)  BP(mean): --  RR: 18 (13 May 2025 20:20) (18 - 18)  SpO2: 99% (13 May 2025 20:20) (99% - 99%)    Parameters below as of 13 May 2025 20:20  Patient On (Oxygen Delivery Method): room air        PHYSICAL EXAM:  Constitutional: No apparent distress  Neurologic:  Mental status: Awake, alert and oriented x3.  Recent and remote memory intact.  Naming, repetition and comprehension intact.  Attention/concentration intact.  No dysarthria, no aphasia.  Fund of knowledge appropriate.    Cranial nerves: Pupils equally round and reactive to light, visual fields full, no nystagmus, extraocular muscles intact, V1 through V3 intact bilaterally and symmetric, face symmetric, hearing intact to finger rub, palate elevation symmetric, tongue was midline, shoulder shrug strength bilaterally 5/5.    Motor:  Normal bulk and tone, strength 5/5 in bilateral upper and lower extremities.   strength 5/5.  Rapid alternating movements intact and symmetric.   Sensation: Intact to light touch.  No neglect.   Coordination: No dysmetria on finger-to-nose and heel-to-shin.  No clumsiness.  Reflexes: Not assessed due to acute setting  HINTS: No nystagmus, no skew deviation, no corrective saccades (patient not vertiginous at time of assessment)  Gait: Narrow and steady. No ataxia.  Romberg negative    NIHSS: 0    Fingerstick Blood Glucose: CAPILLARY BLOOD GLUCOSE  98 (13 May 2025 19:00)      POCT Blood Glucose.: 98 mg/dL (13 May 2025 18:38)       LABS:                        14.2   8.91  )-----------( 258      ( 13 May 2025 18:48 )             42.8     05-13    140  |  101  |  14  ----------------------------<  101[H]  4.7   |  20[L]  |  0.82    Ca    10.2      13 May 2025 18:48    TPro  7.3  /  Alb  4.5  /  TBili  0.3  /  DBili  x   /  AST  17  /  ALT  10  /  AlkPhos  68  05-13    PT/INR - ( 13 May 2025 18:48 )   PT: 11.1 sec;   INR: 0.96 ratio         PTT - ( 13 May 2025 18:48 )  PTT:32.3 sec      Urinalysis Basic - ( 13 May 2025 18:48 )    Color: x / Appearance: x / SG: x / pH: x  Gluc: 101 mg/dL / Ketone: x  / Bili: x / Urobili: x   Blood: x / Protein: x / Nitrite: x   Leuk Esterase: x / RBC: x / WBC x   Sq Epi: x / Non Sq Epi: x / Bacteria: x        RADIOLOGY & ADDITIONAL STUDIES:    < from: CT Brain Stroke Protocol (05.13.25 @ 19:05) >  MPRESSION:  No acute intracranial hemorrhage, mass effect, or midline shift. No large   arterial distribution acute infarct    < end of copied text >  < from: CT Angio Brain Stroke Protocol  w/ IV Cont (05.13.25 @ 19:06) >  IMPRESSION:    CT PERFUSION:  Technical limitations: Limited by mild to moderate patient motion during   image acquisition.    Core infarction: 0 ml  Penumbra / tissue at risk for active ischemia: 0 ml    CTA NECK:  No evidence of significant stenosis or occlusion.    CTA HEAD:  1.  No large vessel occlusion..  2. Deposition of calcium in association with the cavernous and   supraclinoid portions of the bilateral internal carotid arteries with   mild stenosis bilaterally in these locations.  3. The left vertebral is hypoplastic and terminates in the PICA, a normal   anatomical variant..  4.. Deposition of calcified plaque with short segment moderate to severe   stenosis involving the proximal intracranial right vertebral artery.  5.  Infundibulum with patent right posterior communicating artery. No   discrete aneurysm identified. Tiny aneurysms can be beyond the resolution   of CTA technique.    < end of copied text >     **STROKE CODE CONSULT NOTE**    Last known well time/Time of onset of symptoms: 5/13 @1630    HPI: Ms. Couch is a 65-year-old female with PMHx vertigo, seizure disorder on Keppra, pre-DM, on aspirin/statin who presented as code stroke due to episode of dizziness. Patient had sudden onset of room-spinning dizziness at 1630, associated with slurred speech which lasted less than 30 seconds. She also reports feeling wobbly in both legs, denies fall. She took meclizine shortly after onset of symptoms and symptoms resolved after approximately 1 hour. Patient reports that her symptoms feel similar to her typical vertiginous episodes with exception of the brief episode of slurred speech which she has never had before. Patient reports that she has been having significant symptoms of allergic rhinitis for the past 2 days. Initial vitals in ED: /80.    Upon neurology assessment, patient reports she still feels a bit unsteady with walking. She otherwise denies any current dizziness or other deficits. No history of stroke.    PAST MEDICAL & SURGICAL HISTORY:  Seizure disorder      Diabetes      No significant past surgical history          FAMILY HISTORY:      SOCIAL HISTORY:  Smoking Cessation: Nonsmoker    ROS:  Constitutional: No fever, weight loss or fatigue  Eyes: No eye pain, visual disturbances, or discharge  ENMT:  No difficulty hearing, tinnitus, No sinus or throat pain  Neck: No pain or stiffness  Respiratory: No cough, wheezing, chills or hemoptysis  Cardiovascular: No chest pain, palpitations, shortness of breath, dizziness or leg swelling  Gastrointestinal: No abdominal pain. No nausea, vomiting or hematemesis; No diarrhea or constipation. Nohematochezia.  Genitourinary: No dysuria, frequency, hematuria or incontinence  Neurological: As per HPI  Skin: No itching, burning, rashes or lesions   Endocrine: No heat or cold intolerance; No hair loss  Musculoskeletal: No joint pain or swelling; No muscle, back or extremity pain  Psychiatric: No depression, anxiety, mood swings or difficulty sleeping  Heme/Lymph: No easy bruising or bleeding gums    MEDICATIONS  (STANDING):    MEDICATIONS  (PRN):      Allergies    No Known Allergies    Intolerances        Vital Signs Last 24 Hrs  T(C): 36.4 (13 May 2025 20:20), Max: 36.4 (13 May 2025 18:34)  T(F): 97.5 (13 May 2025 20:20), Max: 97.5 (13 May 2025 18:34)  HR: 78 (13 May 2025 20:20) (78 - 83)  BP: 165/88 (13 May 2025 20:20) (158/80 - 165/88)  BP(mean): --  RR: 18 (13 May 2025 20:20) (18 - 18)  SpO2: 99% (13 May 2025 20:20) (99% - 99%)    Parameters below as of 13 May 2025 20:20  Patient On (Oxygen Delivery Method): room air        PHYSICAL EXAM:  Constitutional: No apparent distress  Neurologic:  Mental status: Awake, alert and oriented x3.  Recent and remote memory intact.  Naming, repetition and comprehension intact.  Attention/concentration intact.  No dysarthria, no aphasia.  Fund of knowledge appropriate.    Cranial nerves: Pupils equally round and reactive to light, visual fields full, no nystagmus, extraocular muscles intact, V1 through V3 intact bilaterally and symmetric, face symmetric, hearing intact to finger rub, palate elevation symmetric, tongue was midline, shoulder shrug strength bilaterally 5/5.    Motor:  Normal bulk and tone, strength 5/5 in bilateral upper and lower extremities.   strength 5/5.  Rapid alternating movements intact and symmetric.   Sensation: Intact to light touch.  No neglect.   Coordination: No dysmetria on finger-to-nose and heel-to-shin.  No clumsiness.  Reflexes: Not assessed due to acute setting  HINTS: No nystagmus, no skew deviation, no corrective saccades (patient not vertiginous at time of assessment)  Gait: Narrow and steady. No ataxia.  Romberg negative    NIHSS: 0    Fingerstick Blood Glucose: CAPILLARY BLOOD GLUCOSE  98 (13 May 2025 19:00)      POCT Blood Glucose.: 98 mg/dL (13 May 2025 18:38)       LABS:                        14.2   8.91  )-----------( 258      ( 13 May 2025 18:48 )             42.8     05-13    140  |  101  |  14  ----------------------------<  101[H]  4.7   |  20[L]  |  0.82    Ca    10.2      13 May 2025 18:48    TPro  7.3  /  Alb  4.5  /  TBili  0.3  /  DBili  x   /  AST  17  /  ALT  10  /  AlkPhos  68  05-13    PT/INR - ( 13 May 2025 18:48 )   PT: 11.1 sec;   INR: 0.96 ratio         PTT - ( 13 May 2025 18:48 )  PTT:32.3 sec      Urinalysis Basic - ( 13 May 2025 18:48 )    Color: x / Appearance: x / SG: x / pH: x  Gluc: 101 mg/dL / Ketone: x  / Bili: x / Urobili: x   Blood: x / Protein: x / Nitrite: x   Leuk Esterase: x / RBC: x / WBC x   Sq Epi: x / Non Sq Epi: x / Bacteria: x        RADIOLOGY & ADDITIONAL STUDIES:    < from: CT Brain Stroke Protocol (05.13.25 @ 19:05) >  MPRESSION:  No acute intracranial hemorrhage, mass effect, or midline shift. No large   arterial distribution acute infarct    < end of copied text >  < from: CT Angio Brain Stroke Protocol  w/ IV Cont (05.13.25 @ 19:06) >  IMPRESSION:    CT PERFUSION:  Technical limitations: Limited by mild to moderate patient motion during   image acquisition.    Core infarction: 0 ml  Penumbra / tissue at risk for active ischemia: 0 ml    CTA NECK:  No evidence of significant stenosis or occlusion.    CTA HEAD:  1.  No large vessel occlusion..  2. Deposition of calcium in association with the cavernous and   supraclinoid portions of the bilateral internal carotid arteries with   mild stenosis bilaterally in these locations.  3. The left vertebral is hypoplastic and terminates in the PICA, a normal   anatomical variant..  4.. Deposition of calcified plaque with short segment moderate to severe   stenosis involving the proximal intracranial right vertebral artery.  5.  Infundibulum with patent right posterior communicating artery. No   discrete aneurysm identified. Tiny aneurysms can be beyond the resolution   of CTA technique.    < end of copied text >

## 2025-05-13 NOTE — STROKE CODE NOTE - IV ALTEPLASE DOOR HIDDEN
Telemetry Bed?: Yes   Admitting Physician: VLADISLAV LOPEZ [[id number]]   Is this a telephone or verbal order?: This is a telephone order from the admitting physician   Transferring Patient to? Only adjust [facility] ([facility] [place]): [facility] [[id number]]   show

## 2025-05-13 NOTE — ED ADULT TRIAGE NOTE - CHIEF COMPLAINT QUOTE
dizziness/unsteady gait x 16:30 for about 10 minutes, states it feels like her vertigo, took Antivert, then still had b/l lower extremity weakness.   states she had a few seconds of slurred speech  hx of seizure disorder   FS 98

## 2025-05-13 NOTE — ED ADULT NURSE NOTE - CHIEF COMPLAINT QUOTE
pt called 911 he states he is feeling down and concerned his bipolar disorder is going out of control.
dizziness/unsteady gait x 16:30 for about 10 minutes, states it feels like her vertigo, took Antivert, then still had b/l lower extremity weakness.   states she had a few seconds of slurred speech  hx of seizure disorder   FS 98

## 2025-05-13 NOTE — ED PROVIDER NOTE - PHYSICAL EXAMINATION
GENERAL: well appearing in no acute distress  HEAD: normocephalic, atraumatic  HEENT: normal conjunctiva, oral mucosa moist,   CARDIAC: regular rate and rhythm, no appreciable murmurs  PULM: normal breath sounds, clear to ascultation bilaterally, no rales, rhonchi, wheezing  GI: abdomen nondistended, soft, nontender, no guarding, rebound tenderness  NEURO: no focal motor or sensory deficits, CN2-12 intact, normal speech, PERRLA, EOMI, normal gait, finger to nose intact, no nystagmus,AAOx3  MSK: strength 5/5 in UE and LE bilaterally no drift in all 4 extremities   SKIN: well-perfused, extremities warm, no visible rashes  PSYCH: appropriate mood and affect

## 2025-05-13 NOTE — ED ADULT NURSE REASSESSMENT NOTE - NS ED NURSE REASSESS COMMENT FT1
break coverage RN, pt walking ind, speech is clear, following commands. pt states " I feel much better now" denies dizziness, cp, sob, weakness, slurred speech. gross neuro intact. A&Ox4, plan of care in progress.

## 2025-05-29 NOTE — PATIENT PROFILE ADULT - NSPROIMPLANTSMEDDEV_GEN_A_NUR
Completed, signed forms placed in MA basket today.  Sincerely,  Dr. Felisha Briggs MD  5/29/2025    11:53 AM     None